# Patient Record
Sex: MALE | Race: WHITE | Employment: FULL TIME | URBAN - NONMETROPOLITAN AREA
[De-identification: names, ages, dates, MRNs, and addresses within clinical notes are randomized per-mention and may not be internally consistent; named-entity substitution may affect disease eponyms.]

---

## 2021-06-25 ENCOUNTER — OFFICE VISIT (OUTPATIENT)
Dept: FAMILY MEDICINE CLINIC | Age: 31
End: 2021-06-25
Payer: COMMERCIAL

## 2021-06-25 VITALS
WEIGHT: 230 LBS | HEART RATE: 87 BPM | DIASTOLIC BLOOD PRESSURE: 68 MMHG | OXYGEN SATURATION: 98 % | HEIGHT: 73 IN | BODY MASS INDEX: 30.48 KG/M2 | SYSTOLIC BLOOD PRESSURE: 128 MMHG

## 2021-06-25 DIAGNOSIS — Z00.00 WELLNESS EXAMINATION: Primary | ICD-10-CM

## 2021-06-25 DIAGNOSIS — M54.2 NECK PAIN: ICD-10-CM

## 2021-06-25 DIAGNOSIS — Z13.1 DIABETES MELLITUS SCREENING: ICD-10-CM

## 2021-06-25 DIAGNOSIS — Z13.220 LIPID SCREENING: ICD-10-CM

## 2021-06-25 DIAGNOSIS — K21.9 GASTROESOPHAGEAL REFLUX DISEASE, UNSPECIFIED WHETHER ESOPHAGITIS PRESENT: ICD-10-CM

## 2021-06-25 DIAGNOSIS — G43.811 OTHER MIGRAINE WITH STATUS MIGRAINOSUS, INTRACTABLE: ICD-10-CM

## 2021-06-25 PROBLEM — G43.909 MIGRAINE: Status: ACTIVE | Noted: 2021-06-25

## 2021-06-25 PROCEDURE — 99385 PREV VISIT NEW AGE 18-39: CPT | Performed by: NURSE PRACTITIONER

## 2021-06-25 RX ORDER — ACETAMINOPHEN 500 MG
1000 TABLET ORAL EVERY 6 HOURS PRN
COMMUNITY
End: 2021-09-20

## 2021-06-25 ASSESSMENT — ENCOUNTER SYMPTOMS
ABDOMINAL PAIN: 0
SORE THROAT: 0
DIARRHEA: 0
CONSTIPATION: 0
SINUS PRESSURE: 1
BACK PAIN: 1
WHEEZING: 0
SHORTNESS OF BREATH: 0
SINUS PAIN: 1
RHINORRHEA: 1
COUGH: 1

## 2021-06-25 NOTE — PROGRESS NOTES
Name: Beni Byrnes  : 1990         Chief Complaint:     Chief Complaint   Patient presents with   [address]     takes tylenol daily for neck pain and headaches, spine infection in 2017 has had 2 surgeries on spine admits to [facility]. hx of family blood clots, had a upper resp. infection in , denies SOB but seems hesitant , admits to pain in chest with lifting large , denies heart flutters, Dr. Ladonna Macario states he has something with bilat knee he is unsure the term, denies sleeps disturbance, denies anxiety and depression, denies trouble with bowels and bladder. History of Present Illness:      Beni Byrnes is a 35 y.o.  male who presents with [facility] (takes tylenol daily for neck pain and headaches, spine infection in 2017 has had 2 surgeries on spine admits to [facility]. hx of family blood clots, had a upper resp. infection in , denies SOB but seems hesitant , admits to pain in chest with lifting large , denies heart flutters, Dr. Ladonna Macario states he has something with bilat knee he is unsure the term, denies sleeps disturbance, denies anxiety and depression, denies trouble with bowels and bladder. )      HPI     The patient presents to [facility]. GERD:   Current treatment includes Tums and Rolaids, symptoms are well managed on these medications. Triggering foods include none. Current symptoms include none. Migraines:  Migraines are occurring less than in the past. He used to have 4-6 migraines per day. He admits having a previous neurological eval in the past. Current migraines occur once every few weeks. He admits they are \"more like headaches than migraines now\". He takes Excedrin and goes to sleep and this resolves the headache. Admits sensitivity to light and noise during headaches. MSK:  He admits two spinal surgeries in 2017 and 2018 that were caused by a spinal infection. Lasting effects include constant neck pain, neck soreness, and stiffness.  He takes tylenol for neck pain (1000mg daily) and this makes pain tolerable. He has completed PT and chiropractor in the past. Chiropractor helped with neck pain. Past Medical History:     No past medical history on file. Reviewed all health maintenance requirements and ordered appropriate tests  There are no preventive care reminders to display for this patient. Past Surgical History:     Past Surgical History:   Procedure Laterality Date    ANKLE SURGERY  2006    SPINAL CORD DECOMPRESSION  2017    SPINAL FUSION  2018    TESTICLE REMOVAL  03/2016    left    WISDOM TOOTH EXTRACTION  2009        Medications:       Prior to Admission medications    Medication Sig Start Date End Date Taking? Authorizing Provider   acetaminophen (TYLENOL) 500 MG tablet Take 1,000 mg by mouth every 6 hours as needed for Pain   Yes Historical Provider, MD        Allergies:       Vancomycin, Bactrim [sulfamethoxazole-trimethoprim], Pcn [penicillins], and Sulfa antibiotics    Social History:     Tobacco:    reports that he has quit smoking. His smoking use included cigarettes. He smoked 0.50 packs per day. He does not have any smokeless tobacco history on file. Alcohol:      reports no history of alcohol use. Drug Use:  reports no history of drug use. Family History:     Family History   Problem Relation Age of Onset    Deep Vein Thrombosis Sister     Diabetes Maternal Grandmother     Bleeding Prob Other        Review of Systems:     Positive and Negative as described in HPI    Review of Systems   Constitutional: Negative for chills, fatigue, fever and unexpected weight change. HENT: Positive for congestion (\"seasonal\"), rhinorrhea (\"seasonal\"), sinus pressure (\"seasonal\") and sinus pain (\"seasonal\"). Negative for postnasal drip and sore throat. Eyes: Negative for visual disturbance. Respiratory: Positive for cough (Admits chronic smokers cough. He stopped smoking 5-7 years ago. ).  Negative for shortness of breath and Neck supple. Comments: No erythema, swelling, or tenderness to palpation to knees bilaterally   Lymphadenopathy:      Cervical: No cervical adenopathy. Skin:     General: Skin is warm. Neurological:      Mental Status: He is alert and oriented to person, place, and time. Psychiatric:         Mood and Affect: Mood normal.         Behavior: Behavior normal.         Thought Content: Thought content normal.         Judgment: Judgment normal.         Data:     Lab Results   Component Value Date     01/16/2017    K 3.3 01/16/2017    CL 97 01/16/2017    CO2 26 01/16/2017    BUN 15 01/16/2017    CREATININE 0.87 01/16/2017    GLUCOSE 92 01/16/2017    PROT 8.1 01/16/2017    LABALBU 4.6 01/16/2017    BILITOT 0.30 01/16/2017    ALKPHOS 110 01/16/2017    AST 27 01/16/2017    ALT 51 01/16/2017     Lab Results   Component Value Date    WBC 13.5 01/16/2017    RBC 5.18 01/16/2017    HGB 14.6 01/16/2017    HCT 44.4 01/16/2017    MCV 85.6 01/16/2017    MCH 28.2 01/16/2017    MCHC 33.0 01/16/2017    RDW 13.2 01/16/2017     01/16/2017    MPV 7.5 01/16/2017     Lab Results   Component Value Date    TSH 4.30 01/16/2017     No results found for: CHOL, HDL, PSA, LABA1C    Assessment/Plan:      Diagnosis Orders   1. Wellness examination  ALT    AST    Basic Metabolic Panel    CBC    Hemoglobin A1C    Lipid Panel   2. Lipid screening  Lipid Panel   3. Diabetes mellitus screening  Hemoglobin A1C   4. Neck pain     5. Other migraine with status migrainosus, intractable     6.  Gastroesophageal reflux disease, unspecified whether esophagitis present         Bilateral Knee Pain:   - Improved with recent change in occupation   - Request records from previous PCP    Neck pain:   - Continue chiropractor   - Request records from previous neurosurgery  - Offered PT, patient declines at this time    Wellness:   - Order for baseline labs placed today   - Quit smoking 5-7 years ago   - Counseled on well balanced diet and routine physical activity   - Recommended debrox for increased cerumen presentation    Completed Refills   Requested Prescriptions      No prescriptions requested or ordered in this encounter       Orders Placed This Encounter   Procedures    ALT     Standing Status:   Future     Standing Expiration Date:   6/25/2022    AST     Standing Status:   Future     Standing Expiration Date:   6/25/2022    Basic Metabolic Panel     Standing Status:   Future     Standing Expiration Date:   6/25/2022    CBC     Standing Status:   Future     Standing Expiration Date:   6/25/2022    Hemoglobin A1C     Standing Status:   Future     Standing Expiration Date:   6/25/2022    Lipid Panel     Standing Status:   Future     Standing Expiration Date:   6/25/2022     Order Specific Question:   Is Patient Fasting?/# of Hours     Answer:   fasting        No results found for this visit on 06/25/21. Return in about 1 year (around 6/25/2022), or if symptoms worsen or fail to improve, for wellness.     Electronically signed by SLICK Paulino CNP on 06/25/21 at 9:55 AM.

## 2021-06-25 NOTE — PATIENT INSTRUCTIONS
Debrox (over the counter ear drop) to assist with softening ear wax. Use on both ears. SURVEY:    You may be receiving a survey from [employer] regarding your visit today. Please complete the survey to enable us to provide the highest quality of care to you and your family. If you cannot score us a very good on any question, please call the office to discuss how we could have made your experience a very good one. Thank you.

## 2021-07-13 ENCOUNTER — TELEPHONE (OUTPATIENT)
Dept: FAMILY MEDICINE CLINIC | Age: 31
End: 2021-07-13

## 2021-07-16 ENCOUNTER — HOSPITAL ENCOUNTER (OUTPATIENT)
Age: 31
Discharge: HOME | End: 2021-07-16
Payer: COMMERCIAL

## 2021-07-16 DIAGNOSIS — Z13.1 DIABETES MELLITUS SCREENING: ICD-10-CM

## 2021-07-16 DIAGNOSIS — Z00.00 WELLNESS EXAMINATION: ICD-10-CM

## 2021-07-16 DIAGNOSIS — Z13.220 LIPID SCREENING: ICD-10-CM

## 2021-07-16 LAB
ALT SERPL-CCNC: 126 U/L (ref 5–41)
ANION GAP SERPL CALCULATED.3IONS-SCNC: 13 MMOL/L (ref 9–17)
AST SERPL-CCNC: 52 U/L
BUN BLDV-MCNC: 17 MG/DL (ref 6–20)
BUN/CREAT BLD: 18 (ref 9–20)
CALCIUM SERPL-MCNC: 9.5 MG/DL (ref 8.6–10.4)
CHLORIDE BLD-SCNC: 99 MMOL/L (ref 98–107)
CHOLESTEROL/HDL RATIO: 3.6
CHOLESTEROL: 142 MG/DL
CO2: 25 MMOL/L (ref 20–31)
CREAT SERPL-MCNC: 0.92 MG/DL (ref 0.7–1.2)
ESTIMATED AVERAGE GLUCOSE: 94 MG/DL
GFR AFRICAN AMERICAN: >60 ML/MIN
GFR NON-AFRICAN AMERICAN: >60 ML/MIN
GFR SERPL CREATININE-BSD FRML MDRD: NORMAL ML/MIN/{1.73_M2}
GFR SERPL CREATININE-BSD FRML MDRD: NORMAL ML/MIN/{1.73_M2}
GLUCOSE BLD-MCNC: 79 MG/DL (ref 70–99)
HBA1C MFR BLD: 4.9 % (ref 4–6)
HCT VFR BLD CALC: 44.6 % (ref 40.7–50.3)
HDLC SERPL-MCNC: 40 MG/DL
HEMOGLOBIN: 15.2 G/DL (ref 13–17)
LDL CHOLESTEROL: 80 MG/DL (ref 0–130)
MCH RBC QN AUTO: 30.2 PG (ref 25.2–33.5)
MCHC RBC AUTO-ENTMCNC: 34.1 G/DL (ref 28.4–34.8)
MCV RBC AUTO: 88.5 FL (ref 82.6–102.9)
NRBC AUTOMATED: 0 PER 100 WBC
PDW BLD-RTO: 12.7 % (ref 11.8–14.4)
PLATELET # BLD: 226 K/UL (ref 138–453)
PMV BLD AUTO: 9.3 FL (ref 8.1–13.5)
POTASSIUM SERPL-SCNC: 4.1 MMOL/L (ref 3.7–5.3)
RBC # BLD: 5.04 M/UL (ref 4.21–5.77)
SODIUM BLD-SCNC: 137 MMOL/L (ref 135–144)
TRIGL SERPL-MCNC: 109 MG/DL
VLDLC SERPL CALC-MCNC: ABNORMAL MG/DL (ref 1–30)
WBC # BLD: 8.9 K/UL (ref 3.5–11.3)

## 2021-07-16 PROCEDURE — 36415 COLL VENOUS BLD VENIPUNCTURE: CPT

## 2021-07-16 PROCEDURE — 84460 ALANINE AMINO (ALT) (SGPT): CPT

## 2021-07-16 PROCEDURE — 85027 COMPLETE CBC AUTOMATED: CPT

## 2021-07-16 PROCEDURE — 83036 HEMOGLOBIN GLYCOSYLATED A1C: CPT

## 2021-07-16 PROCEDURE — 80048 BASIC METABOLIC PNL TOTAL CA: CPT

## 2021-07-16 PROCEDURE — 84450 TRANSFERASE (AST) (SGOT): CPT

## 2021-07-16 PROCEDURE — 80061 LIPID PANEL: CPT

## 2021-07-19 ENCOUNTER — TELEPHONE (OUTPATIENT)
Dept: FAMILY MEDICINE CLINIC | Age: 31
End: 2021-07-19

## 2021-07-19 DIAGNOSIS — R74.8 ELEVATED LIVER ENZYMES: Primary | ICD-10-CM

## 2021-07-19 NOTE — TELEPHONE ENCOUNTER
Please notify patient that I have reviewed his previous medical records and would like to discuss them, as well as address his neck pain further, with a visit. Please schedule appt. Thanks!

## 2021-07-23 ENCOUNTER — OFFICE VISIT (OUTPATIENT)
Dept: FAMILY MEDICINE CLINIC | Age: 31
End: 2021-07-23
Payer: COMMERCIAL

## 2021-07-23 VITALS
HEIGHT: 73 IN | BODY MASS INDEX: 30.99 KG/M2 | SYSTOLIC BLOOD PRESSURE: 122 MMHG | WEIGHT: 233.8 LBS | OXYGEN SATURATION: 98 % | DIASTOLIC BLOOD PRESSURE: 66 MMHG | HEART RATE: 92 BPM

## 2021-07-23 DIAGNOSIS — M54.2 NECK PAIN: Primary | ICD-10-CM

## 2021-07-23 PROCEDURE — 99213 OFFICE O/P EST LOW 20 MIN: CPT | Performed by: NURSE PRACTITIONER

## 2021-07-23 ASSESSMENT — PATIENT HEALTH QUESTIONNAIRE - PHQ9
SUM OF ALL RESPONSES TO PHQ QUESTIONS 1-9: 0
SUM OF ALL RESPONSES TO PHQ9 QUESTIONS 1 & 2: 0
2. FEELING DOWN, DEPRESSED OR HOPELESS: 0
SUM OF ALL RESPONSES TO PHQ QUESTIONS 1-9: 0
1. LITTLE INTEREST OR PLEASURE IN DOING THINGS: 0
SUM OF ALL RESPONSES TO PHQ QUESTIONS 1-9: 0

## 2021-07-23 NOTE — PROGRESS NOTES
Name: Mathew Perez  : 1990         Chief Complaint:     Chief Complaint   Patient presents with    Neck Pain     pt states he always has neck pain gets worse with excessive movement .  Other     here to discuss labs        History of Present Illness:      Mathew Perez is a 35 y.o.  male who presents with Neck Pain (pt states he always has neck pain gets worse with excessive movement . ) and Other (here to discuss labs )      HPI     The patient presents to discuss neck pain. Neck pain began . Denies known neck injury. Neck pain is constant. Pain described as dull, achy, and sharp. Neck pain rated 3/10. He uses tylenol in the morning before work and aleve at night and these \"get him through\". He reports neck cramping if he moves his neck too quickly. He states his neck stiffens often. He is following with Dr. Benjamin Montoya (neurosurgery) in [facility]. He completes xrays once yearly through his neurosurgeon. He has had several neck surgeries in the past (see media for details). Past Medical History:     No past medical history on file. Reviewed all health maintenance requirements and ordered appropriate tests  There are no preventive care reminders to display for this patient. Past Surgical History:     Past Surgical History:   Procedure Laterality Date    ANKLE SURGERY  2006    SPINAL CORD DECOMPRESSION  2017    SPINAL FUSION  2018    TESTICLE REMOVAL  2016    left    WISDOM TOOTH EXTRACTION          Medications:       Prior to Admission medications    Medication Sig Start Date End Date Taking?  Authorizing Provider   Naproxen Sodium (ALEVE PO) Take by mouth   Yes Historical Provider, MD   acetaminophen (TYLENOL) 500 MG tablet Take 1,000 mg by mouth every 6 hours as needed for Pain   Yes Historical Provider, MD        Allergies:       Vancomycin, Bactrim [sulfamethoxazole-trimethoprim], Pcn [penicillins], and Sulfa antibiotics    Social History:     Tobacco:    reports that he has quit smoking. His smoking use included cigarettes. He smoked 0.50 packs per day. He does not have any smokeless tobacco history on file. Alcohol:      reports no history of alcohol use. Drug Use:  reports no history of drug use. Family History:     Family History   Problem Relation Age of Onset    Deep Vein Thrombosis Sister     Diabetes Maternal Grandmother     Bleeding Prob Other        Review of Systems:     Positive and Negative as described in HPI    Review of Systems   Musculoskeletal: Positive for neck pain and neck stiffness. Physical Exam:   Vitals:  /66   Pulse 92   Ht 6' 1\" (1.854 m)   Wt 233 lb 12.8 oz (106.1 kg)   SpO2 98%   BMI 30.85 kg/m²     Physical Exam  Constitutional:       General: He is not in acute distress. Appearance: Normal appearance. He is normal weight. He is not ill-appearing or toxic-appearing. HENT:      Head: Normocephalic. Neurological:      Mental Status: He is alert. Psychiatric:         Mood and Affect: Mood normal.         Behavior: Behavior normal.         Thought Content:  Thought content normal.         Judgment: Judgment normal.         Data:     Lab Results   Component Value Date     07/16/2021    K 4.1 07/16/2021    CL 99 07/16/2021    CO2 25 07/16/2021    BUN 17 07/16/2021    CREATININE 0.92 07/16/2021    GLUCOSE 79 07/16/2021    PROT 8.1 01/16/2017    LABALBU 4.6 01/16/2017    BILITOT 0.30 01/16/2017    ALKPHOS 110 01/16/2017    AST 52 07/16/2021     07/16/2021     Lab Results   Component Value Date    WBC 8.9 07/16/2021    RBC 5.04 07/16/2021    HGB 15.2 07/16/2021    HCT 44.6 07/16/2021    MCV 88.5 07/16/2021    MCH 30.2 07/16/2021    MCHC 34.1 07/16/2021    RDW 12.7 07/16/2021     07/16/2021    MPV 9.3 07/16/2021     Lab Results   Component Value Date    TSH 4.30 01/16/2017     Lab Results   Component Value Date    CHOL 142 07/16/2021    HDL 40 07/16/2021    LABA1C 4.9 07/16/2021       Assessment/Plan: Diagnosis Orders   1. Neck pain         -I have reviewed the patient's records from [id number] showing extensive neurosurgery involvement for cervical laminectomy, epidural abscess, etc.  -Discussed the patient's neck pain symptoms at length. Discussed my concerns regarding elevated liver enzymes due to increased Tylenol intake daily. Discussed hepatic risks of excessive tylenol intake.   -Discussed alternative treatments for his pain. Patient declines referral to pain management as well as physical therapy. I offered prescription of Mobic. Patient is hesitant and prefers to complete research prior to filling prescription.  -He will continue to follow-up with his neurosurgeon, Dr. Kam Taylor, once yearly.  -Notify office if symptoms worsen or persist    Completed Refills   Requested Prescriptions      No prescriptions requested or ordered in this encounter       No orders of the defined types were placed in this encounter. No results found for this visit on 07/23/21. Return if symptoms worsen or fail to improve.     Electronically signed by SLICK Hilario CNP on 07/23/21 at 3:54 PM.

## 2021-07-23 NOTE — PATIENT INSTRUCTIONS
Mobic (NSAID) - assist with neck pain    SURVEY:    You may be receiving a survey from [employer] regarding your visit today. Please complete the survey to enable us to provide the highest quality of care to you and your family. If you cannot score us a very good on any question, please call the office to discuss how we could have made your experience a very good one. Thank you.

## 2021-08-03 ENCOUNTER — HOSPITAL ENCOUNTER (OUTPATIENT)
Dept: GENERAL RADIOLOGY | Age: 31
Discharge: HOME | End: 2021-08-05
Payer: COMMERCIAL

## 2021-08-03 ENCOUNTER — OFFICE VISIT (OUTPATIENT)
Dept: FAMILY MEDICINE CLINIC | Age: 31
End: 2021-08-03
Payer: COMMERCIAL

## 2021-08-03 ENCOUNTER — HOSPITAL ENCOUNTER (OUTPATIENT)
Age: 31
Discharge: HOME | End: 2021-08-05
Payer: COMMERCIAL

## 2021-08-03 VITALS
SYSTOLIC BLOOD PRESSURE: 128 MMHG | DIASTOLIC BLOOD PRESSURE: 86 MMHG | WEIGHT: 233 LBS | HEIGHT: 73 IN | BODY MASS INDEX: 30.88 KG/M2

## 2021-08-03 DIAGNOSIS — M25.572 ACUTE LEFT ANKLE PAIN: ICD-10-CM

## 2021-08-03 DIAGNOSIS — M25.572 ACUTE LEFT ANKLE PAIN: Primary | ICD-10-CM

## 2021-08-03 DIAGNOSIS — M79.672 ACUTE FOOT PAIN, LEFT: ICD-10-CM

## 2021-08-03 PROCEDURE — 73630 X-RAY EXAM OF FOOT: CPT

## 2021-08-03 PROCEDURE — 73610 X-RAY EXAM OF ANKLE: CPT

## 2021-08-03 PROCEDURE — 99213 OFFICE O/P EST LOW 20 MIN: CPT | Performed by: NURSE PRACTITIONER

## 2021-08-03 RX ORDER — NAPROXEN 500 MG/1
500 TABLET ORAL 2 TIMES DAILY PRN
Qty: 30 TABLET | Refills: 0 | Status: SHIPPED | OUTPATIENT
Start: 2021-08-03 | End: 2021-09-20

## 2021-08-03 NOTE — PATIENT INSTRUCTIONS
SURVEY:    You may be receiving a survey from [employer] regarding your visit today. Please complete the survey to enable us to provide the highest quality of care to you and your family. If you cannot score us a very good on any question, please call the office to discuss how we could have made your experience a very good one. Thank you.

## 2021-08-03 NOTE — PROGRESS NOTES
Name: Maryana Peck  : 1990         Chief Complaint:     Chief Complaint   Patient presents with    Ankle Injury     Patient presents today for a 4 day history of L ankle pain and swelling. [place] was [place] [employer] [place], was jumping and slipped. States there is pain going up into his posterior calf. He has tried ice, heat, OTC medications and the only thing that offers any relief is once he has walked on it for awhile the ankle goes numb. History of Present Illness:      Maryana Peck is a 35 y.o.  male who presents with Ankle Injury (Patient presents today for a 4 day history of L ankle pain and swelling. He was at a [employer] [place] on [place], was jumping and slipped. States there is pain going up into his posterior calf. He has tried ice, heat, OTC medications and the only thing that offers any relief is once he has walked on it for awhile the ankle goes numb.)      HPI     The patient presents with complaints of left ankle pain. He states that he was at a [employer] [place] and was jumping and slipped. Pain is present left lateral foot and radiates up posterior calf. He admits hearing \"pops\" from his left calf. He has tried ice, heat, and OTC medications with no relief including tylenol and Aleve. The ankle goes numb after walking on the foot which causes relief. Pain is worsened with moving his left ankle. He is able to bear weight onto the ankle but he \"hobbles\". He admits previously fracturing his left ankle and has undergone 2-3 surgeries on the left ankle roughly 15 years ago. Past Medical History:     No past medical history on file. Reviewed all health maintenance requirements and ordered appropriate tests  There are no preventive care reminders to display for this patient.     Past Surgical History:     Past Surgical History:   Procedure Laterality Date    ANKLE SURGERY  2006    SPINAL CORD DECOMPRESSION  2017    SPINAL FUSION  2018    TESTICLE REMOVAL  2016 left    WISDOM TOOTH EXTRACTION  2009        Medications:       Prior to Admission medications    Medication Sig Start Date End Date Taking? Authorizing Provider   naproxen (NAPROSYN) 500 MG tablet Take 1 tablet by mouth 2 times daily as needed for Pain 8/3/21  Yes SLICK Santos CNP   acetaminophen (TYLENOL) 500 MG tablet Take 1,000 mg by mouth every 6 hours as needed for Pain   Yes Historical Provider, MD   Naproxen Sodium (ALEVE PO) Take by mouth  Patient not taking: Reported on 8/3/2021    Historical Provider, MD        Allergies:       Vancomycin, Bactrim [sulfamethoxazole-trimethoprim], Pcn [penicillins], and Sulfa antibiotics    Social History:     Tobacco:    reports that he has quit smoking. His smoking use included cigarettes. He smoked 0.50 packs per day. He does not have any smokeless tobacco history on file. Alcohol:      reports no history of alcohol use. Drug Use:  reports no history of drug use. Family History:     Family History   Problem Relation Age of Onset    Deep Vein Thrombosis Sister     Diabetes Maternal Grandmother     Bleeding Prob Other        Review of Systems:     Positive and Negative as described in HPI    Review of Systems   Musculoskeletal: Positive for arthralgias. Physical Exam:   Vitals:  /86 (Site: Left Upper Arm, Position: Sitting, Cuff Size: Large Adult)   Ht 6' 1\" (1.854 m)   Wt 233 lb (105.7 kg)   BMI 30.74 kg/m²     Physical Exam  Constitutional:       General: He is not in acute distress. Appearance: Normal appearance. He is normal weight. He is not ill-appearing or toxic-appearing. Cardiovascular:      Rate and Rhythm: Normal rate and regular rhythm. Heart sounds: Normal heart sounds. No murmur heard. Pulmonary:      Effort: Pulmonary effort is normal. No respiratory distress. Breath sounds: Normal breath sounds. No stridor. No wheezing, rhonchi or rales.    Musculoskeletal:      Comments: Left posterior tibialis pulse 2+.  Limited left ankle flexion and extension. Voiced pain with palpation of generalized dorsal and lateral aspect of left foot. Mild edema over lateral malleolus. No calf swelling, warmth, or redness. Limping gait. Neurological:      Mental Status: He is alert. Psychiatric:         Mood and Affect: Mood normal.         Behavior: Behavior normal.         Thought Content: Thought content normal.         Judgment: Judgment normal.         Data:     Lab Results   Component Value Date     07/16/2021    K 4.1 07/16/2021    CL 99 07/16/2021    CO2 25 07/16/2021    BUN 17 07/16/2021    CREATININE 0.92 07/16/2021    GLUCOSE 79 07/16/2021    PROT 8.1 01/16/2017    LABALBU 4.6 01/16/2017    BILITOT 0.30 01/16/2017    ALKPHOS 110 01/16/2017    AST 52 07/16/2021     07/16/2021     Lab Results   Component Value Date    WBC 8.9 07/16/2021    RBC 5.04 07/16/2021    HGB 15.2 07/16/2021    HCT 44.6 07/16/2021    MCV 88.5 07/16/2021    MCH 30.2 07/16/2021    MCHC 34.1 07/16/2021    RDW 12.7 07/16/2021     07/16/2021    MPV 9.3 07/16/2021     Lab Results   Component Value Date    TSH 4.30 01/16/2017     Lab Results   Component Value Date    CHOL 142 07/16/2021    HDL 40 07/16/2021    LABA1C 4.9 07/16/2021       Assessment/Plan:      Diagnosis Orders   1. Acute left ankle pain  XR ANKLE LEFT (2 VIEWS)   2. Acute foot pain, left  XR FOOT LEFT (2 VIEWS)       -Recommended rest, ice, compression with Ace bandage, and elevation.  -Based on history and physical exam findings, I recommend left foot and ankle x-ray for further evaluation  -Rx naproxen to assist with inflammation and pain.  -Will call patient with x-ray results and update treatment plan as appropriate.     Completed Refills   Requested Prescriptions     Signed Prescriptions Disp Refills    naproxen (NAPROSYN) 500 MG tablet 30 tablet 0     Sig: Take 1 tablet by mouth 2 times daily as needed for Pain       Orders Placed This Encounter   Procedures    XR ANKLE LEFT (2 VIEWS)     Standing Status:   Future     Standing Expiration Date:   8/3/2022     Order Specific Question:   Reason for exam:     Answer:   left ankle pain, s/p fall    XR FOOT LEFT (2 VIEWS)     Standing Status:   Future     Standing Expiration Date:   8/3/2022     Order Specific Question:   Reason for exam:     Answer:   acut left foot pain, s/p fall        No results found for this visit on 08/03/21. Return if symptoms worsen or fail to improve.     Electronically signed by SLICK Hummel CNP on 08/03/21 at 4:40 PM.

## 2021-09-20 ENCOUNTER — APPOINTMENT (OUTPATIENT)
Dept: CT IMAGING | Age: 31
End: 2021-09-20
Payer: COMMERCIAL

## 2021-09-20 ENCOUNTER — HOSPITAL ENCOUNTER (EMERGENCY)
Age: 31
Discharge: HOME | End: 2021-09-20
Payer: COMMERCIAL

## 2021-09-20 VITALS
OXYGEN SATURATION: 97 % | RESPIRATION RATE: 18 BRPM | SYSTOLIC BLOOD PRESSURE: 134 MMHG | DIASTOLIC BLOOD PRESSURE: 73 MMHG | WEIGHT: 235 LBS | HEIGHT: 73 IN | TEMPERATURE: 97.9 F | BODY MASS INDEX: 31.14 KG/M2 | HEART RATE: 78 BPM

## 2021-09-20 DIAGNOSIS — M43.06 PARS DEFECT OF LUMBAR SPINE: ICD-10-CM

## 2021-09-20 DIAGNOSIS — K76.0 FATTY LIVER: ICD-10-CM

## 2021-09-20 DIAGNOSIS — R10.9 ABDOMINAL PAIN, UNSPECIFIED ABDOMINAL LOCATION: Primary | ICD-10-CM

## 2021-09-20 LAB
-: NORMAL
ABSOLUTE EOS #: 0.1 K/UL (ref 0–0.44)
ABSOLUTE IMMATURE GRANULOCYTE: 0.03 K/UL (ref 0–0.3)
ABSOLUTE LYMPH #: 2.06 K/UL (ref 1.1–3.7)
ABSOLUTE MONO #: 0.33 K/UL (ref 0.1–1.2)
ALBUMIN SERPL-MCNC: 4.7 G/DL (ref 3.5–5.2)
ALBUMIN/GLOBULIN RATIO: 1.9 (ref 1–2.5)
ALP BLD-CCNC: 95 U/L (ref 40–129)
ALT SERPL-CCNC: 83 U/L (ref 5–41)
AMORPHOUS: NORMAL
ANION GAP SERPL CALCULATED.3IONS-SCNC: 12 MMOL/L (ref 9–17)
AST SERPL-CCNC: 37 U/L
BACTERIA: NORMAL
BASOPHILS # BLD: 0 % (ref 0–2)
BASOPHILS ABSOLUTE: 0.03 K/UL (ref 0–0.2)
BILIRUB SERPL-MCNC: 0.33 MG/DL (ref 0.3–1.2)
BILIRUBIN URINE: NEGATIVE
BUN BLDV-MCNC: 13 MG/DL (ref 6–20)
BUN/CREAT BLD: 14 (ref 9–20)
CALCIUM SERPL-MCNC: 9.4 MG/DL (ref 8.6–10.4)
CASTS UA: NORMAL /LPF
CHLORIDE BLD-SCNC: 105 MMOL/L (ref 98–107)
CO2: 25 MMOL/L (ref 20–31)
COLOR: YELLOW
COMMENT UA: NORMAL
CREAT SERPL-MCNC: 0.9 MG/DL (ref 0.7–1.2)
CRYSTALS, UA: NORMAL /HPF
DIFFERENTIAL TYPE: NORMAL
EOSINOPHILS RELATIVE PERCENT: 2 % (ref 1–4)
EPITHELIAL CELLS UA: NORMAL /HPF (ref 0–5)
GFR AFRICAN AMERICAN: >60 ML/MIN
GFR NON-AFRICAN AMERICAN: >60 ML/MIN
GFR SERPL CREATININE-BSD FRML MDRD: ABNORMAL ML/MIN/{1.73_M2}
GFR SERPL CREATININE-BSD FRML MDRD: ABNORMAL ML/MIN/{1.73_M2}
GLUCOSE BLD-MCNC: 125 MG/DL (ref 70–99)
GLUCOSE URINE: NEGATIVE
HCT VFR BLD CALC: 47 % (ref 40.7–50.3)
HEMOGLOBIN: 15.5 G/DL (ref 13–17)
IMMATURE GRANULOCYTES: 0 %
KETONES, URINE: NEGATIVE
LACTIC ACID, WHOLE BLOOD: NORMAL MMOL/L (ref 0.7–2.1)
LACTIC ACID: 1.3 MMOL/L (ref 0.5–2.2)
LEUKOCYTE ESTERASE, URINE: NEGATIVE
LIPASE: 26 U/L (ref 13–60)
LYMPHOCYTES # BLD: 30 % (ref 24–43)
MCH RBC QN AUTO: 29.6 PG (ref 25.2–33.5)
MCHC RBC AUTO-ENTMCNC: 33 G/DL (ref 28.4–34.8)
MCV RBC AUTO: 89.9 FL (ref 82.6–102.9)
MONOCYTES # BLD: 5 % (ref 3–12)
MUCUS: NORMAL
NITRITE, URINE: NEGATIVE
NRBC AUTOMATED: 0 PER 100 WBC
OTHER OBSERVATIONS UA: NORMAL
PDW BLD-RTO: 12.4 % (ref 11.8–14.4)
PH UA: 7 (ref 5–9)
PLATELET # BLD: 232 K/UL (ref 138–453)
PLATELET ESTIMATE: NORMAL
PMV BLD AUTO: 9.1 FL (ref 8.1–13.5)
POTASSIUM SERPL-SCNC: 4 MMOL/L (ref 3.7–5.3)
PROTEIN UA: NEGATIVE
RBC # BLD: 5.23 M/UL (ref 4.21–5.77)
RBC # BLD: NORMAL 10*6/UL
RBC UA: NORMAL /HPF (ref 0–2)
RENAL EPITHELIAL, UA: NORMAL /HPF
SARS-COV-2, RAPID: NOT DETECTED
SEG NEUTROPHILS: 63 % (ref 36–65)
SEGMENTED NEUTROPHILS ABSOLUTE COUNT: 4.32 K/UL (ref 1.5–8.1)
SODIUM BLD-SCNC: 142 MMOL/L (ref 135–144)
SPECIFIC GRAVITY UA: 1.02 (ref 1.01–1.02)
SPECIMEN DESCRIPTION: NORMAL
TOTAL PROTEIN: 7.2 G/DL (ref 6.4–8.3)
TRICHOMONAS: NORMAL
TURBIDITY: CLEAR
URINE HGB: NEGATIVE
UROBILINOGEN, URINE: NORMAL
WBC # BLD: 6.9 K/UL (ref 3.5–11.3)
WBC # BLD: NORMAL 10*3/UL
WBC UA: NORMAL /HPF (ref 0–5)
YEAST: NORMAL

## 2021-09-20 PROCEDURE — 87635 SARS-COV-2 COVID-19 AMP PRB: CPT

## 2021-09-20 PROCEDURE — 99283 EMERGENCY DEPT VISIT LOW MDM: CPT

## 2021-09-20 PROCEDURE — 85025 COMPLETE CBC W/AUTO DIFF WBC: CPT

## 2021-09-20 PROCEDURE — 6360000002 HC RX W HCPCS: Performed by: PHYSICIAN ASSISTANT

## 2021-09-20 PROCEDURE — 36415 COLL VENOUS BLD VENIPUNCTURE: CPT

## 2021-09-20 PROCEDURE — 96375 TX/PRO/DX INJ NEW DRUG ADDON: CPT

## 2021-09-20 PROCEDURE — 83690 ASSAY OF LIPASE: CPT

## 2021-09-20 PROCEDURE — 74177 CT ABD & PELVIS W/CONTRAST: CPT

## 2021-09-20 PROCEDURE — 6360000004 HC RX CONTRAST MEDICATION: Performed by: PHYSICIAN ASSISTANT

## 2021-09-20 PROCEDURE — C9803 HOPD COVID-19 SPEC COLLECT: HCPCS

## 2021-09-20 PROCEDURE — 2580000003 HC RX 258: Performed by: PHYSICIAN ASSISTANT

## 2021-09-20 PROCEDURE — 96374 THER/PROPH/DIAG INJ IV PUSH: CPT

## 2021-09-20 PROCEDURE — 81001 URINALYSIS AUTO W/SCOPE: CPT

## 2021-09-20 PROCEDURE — 80053 COMPREHEN METABOLIC PANEL: CPT

## 2021-09-20 PROCEDURE — 83605 ASSAY OF LACTIC ACID: CPT

## 2021-09-20 RX ORDER — 0.9 % SODIUM CHLORIDE 0.9 %
1000 INTRAVENOUS SOLUTION INTRAVENOUS ONCE
Status: COMPLETED | OUTPATIENT
Start: 2021-09-20 | End: 2021-09-20

## 2021-09-20 RX ORDER — MORPHINE SULFATE 2 MG/ML
2 INJECTION, SOLUTION INTRAMUSCULAR; INTRAVENOUS ONCE
Status: COMPLETED | OUTPATIENT
Start: 2021-09-20 | End: 2021-09-20

## 2021-09-20 RX ORDER — ONDANSETRON 2 MG/ML
4 INJECTION INTRAMUSCULAR; INTRAVENOUS ONCE
Status: COMPLETED | OUTPATIENT
Start: 2021-09-20 | End: 2021-09-20

## 2021-09-20 RX ADMIN — SODIUM CHLORIDE 1000 ML: 9 INJECTION, SOLUTION INTRAVENOUS at 12:24

## 2021-09-20 RX ADMIN — IOPAMIDOL 75 ML: 755 INJECTION, SOLUTION INTRAVENOUS at 12:11

## 2021-09-20 RX ADMIN — MORPHINE SULFATE 2 MG: 2 INJECTION, SOLUTION INTRAMUSCULAR; INTRAVENOUS at 12:25

## 2021-09-20 RX ADMIN — ONDANSETRON 4 MG: 2 INJECTION INTRAMUSCULAR; INTRAVENOUS at 12:27

## 2021-09-20 ASSESSMENT — PAIN SCALES - GENERAL
PAINLEVEL_OUTOF10: 5
PAINLEVEL_OUTOF10: 5
PAINLEVEL_OUTOF10: 6
PAINLEVEL_OUTOF10: 5

## 2021-09-20 ASSESSMENT — ENCOUNTER SYMPTOMS
SHORTNESS OF BREATH: 0
VOMITING: 1
DIARRHEA: 1
CHEST TIGHTNESS: 0
NAUSEA: 1
RHINORRHEA: 0
WHEEZING: 0
SORE THROAT: 0
COUGH: 0
ABDOMINAL PAIN: 1
EYE DISCHARGE: 0
BACK PAIN: 0
EYE REDNESS: 0
BLOOD IN STOOL: 0
CONSTIPATION: 0

## 2021-09-20 ASSESSMENT — PAIN DESCRIPTION - LOCATION: LOCATION: ABDOMEN

## 2021-09-20 ASSESSMENT — PAIN DESCRIPTION - DESCRIPTORS: DESCRIPTORS: SHARP

## 2021-09-20 ASSESSMENT — PAIN DESCRIPTION - ORIENTATION: ORIENTATION: LEFT;LOWER

## 2021-09-20 ASSESSMENT — PAIN DESCRIPTION - PAIN TYPE: TYPE: ACUTE PAIN

## 2021-09-20 ASSESSMENT — PAIN - FUNCTIONAL ASSESSMENT: PAIN_FUNCTIONAL_ASSESSMENT: 0-10

## 2021-09-20 NOTE — ED PROVIDER NOTES
[address] [facility] ED  EMERGENCY DEPARTMENT ENCOUNTER      Pt Name: Danny Pastor  MRN: [id number]  [place] 1990  Date of evaluation: 9/20/2021  Provider: Sigrid Crane Dr     Chief Complaint   Patient presents with    Abdominal Pain     LLQ, onset this AM         HISTORY OF PRESENT ILLNESS   (Location/Symptom, Timing/Onset, Context/Setting,Quality, Duration, Modifying Factors, Severity)  Note limiting factors. Danny Pastor is a32 y.o. male who presents to the emergency department with complaints of mid to lower abdominal discomfort that started this morning. Associated complaints clued nausea and vomiting throughout the weekend resected that was after drinking alcohol for his 31th birthday. In addition reports he had a couple episodes of diarrhea. He reports a history of IBS. States recently he was told he had elevated liver enzymes. He denies any chest pain or shortness of breath. Denies any headache or dizziness denies any fever or chills. No other complaints at this time. He denies any penile pain scrotal pain or swelling. HPI    Nursing Notes werereviewed. REVIEW OF SYSTEMS    (2-9 systems for level 4, 10 or more for level 5)     Review of Systems   Constitutional: Negative for chills, diaphoresis and fever. HENT: Negative for congestion, ear pain, rhinorrhea and sore throat. Eyes: Negative for discharge, redness and visual disturbance. Respiratory: Negative for cough, chest tightness, shortness of breath and wheezing. Cardiovascular: Negative for chest pain and palpitations. Gastrointestinal: Positive for abdominal pain, diarrhea, nausea and vomiting. Negative for blood in stool and constipation. Endocrine: Negative for polydipsia, polyphagia and polyuria. Genitourinary: Negative for decreased urine volume, difficulty urinating, dysuria, frequency and hematuria. Musculoskeletal: Negative for arthralgias, back pain and myalgias.    Skin: Transportation (Medical):  Lack of Transportation (Non-Medical):    Physical Activity:     Days of Exercise per Week:     Minutes of Exercise per Session:    Stress:     Feeling of Stress :    Social Connections:     Frequency of Communication with Friends and Family:     Frequency of Social Gatherings with Friends and Family:     Attends [employer] Services:     Active Member of Clubs or Organizations:     Attends Club or Organization Meetings:     Marital Status:    Intimate Partner Violence:     Fear of Current or Ex-Partner:     Emotionally Abused:     Physically Abused:     Sexually Abused:        SCREENINGS             PHYSICAL EXAM    (up to 7 for level 4, 8 or more for level 5)     ED Triage Vitals [09/20/21 1100]   BP Temp Temp src Pulse Resp SpO2 Height Weight   (!) 125/95 97.9 °F (36.6 °C) -- 78 18 99 % 6' 1\" (1.854 m) 235 lb (106.6 kg)       Physical Exam  Vitals and nursing note reviewed. Constitutional:       General: He is not in acute distress. Appearance: Normal appearance. He is well-developed. He is not ill-appearing, toxic-appearing or diaphoretic. HENT:      Head: Normocephalic and atraumatic. Right Ear: External ear normal.      Left Ear: External ear normal.      Mouth/Throat:      Mouth: Mucous membranes are moist.      Pharynx: No oropharyngeal exudate or posterior oropharyngeal erythema. Eyes:      General: No scleral icterus. Right eye: No discharge. Left eye: No discharge. Extraocular Movements: Extraocular movements intact. Conjunctiva/sclera: Conjunctivae normal.      Pupils: Pupils are equal, round, and reactive to light. Neck:      Thyroid: No thyromegaly. Trachea: No tracheal deviation. Cardiovascular:      Rate and Rhythm: Normal rate and regular rhythm. Heart sounds: No murmur heard. No friction rub. No gallop. Pulmonary:      Effort: Pulmonary effort is normal. No respiratory distress.       Breath sounds: Normal breath sounds. No stridor. No wheezing or rhonchi. Abdominal:      General: Bowel sounds are normal. There is no distension. Palpations: Abdomen is soft. Tenderness: There is abdominal tenderness. There is no right CVA tenderness, left CVA tenderness, guarding or rebound. Musculoskeletal:         General: No tenderness or deformity. Normal range of motion. Cervical back: Normal range of motion and neck supple. Lymphadenopathy:      Cervical: No cervical adenopathy. Skin:     General: Skin is warm and dry. Capillary Refill: Capillary refill takes less than 2 seconds. Findings: No erythema or rash. Neurological:      General: No focal deficit present. Mental Status: He is alert and oriented to person, place, and time. Cranial Nerves: No cranial nerve deficit. Motor: No abnormal muscle tone. Deep Tendon Reflexes: Reflexes are normal and symmetric. Reflexes normal.   Psychiatric:         Mood and Affect: Mood normal.         Behavior: Behavior normal.         DIAGNOSTIC RESULTS     EKG: All EKG's are interpreted by the Emergency Department Physician who either signs orCo-signs this chart in the absence of a cardiologist.      RADIOLOGY:   Non-plainfilm images such as CT, Ultrasound and MRI are read by the radiologist. Plain radiographic images are visualized and preliminarily interpreted by the emergency physician with the below findings:      Interpretationper the Radiologist below, if available at the time of this note:    CT ABDOMEN PELVIS W IV CONTRAST Additional Contrast? None   Final Result   No acute inflammatory findings within the abdomen or pelvis to explain left   lower quadrant pain. Mild hepatomegaly and steatosis with regions of focal fatty sparing. Trace grade 1 anterolisthesis L5 on S1 due to chronic bilateral L5 pars   interarticularis defects.                ED BEDSIDE ULTRASOUND:   Performed by ED Physician - none    LABS:  Labs Reviewed   COMPREHENSIVE METABOLIC PANEL - Abnormal; Notable for the following components:       Result Value    Glucose 125 (*)     ALT 83 (*)     All other components within normal limits   COVID-19, RAPID   CBC WITH AUTO DIFFERENTIAL   LIPASE   LACTIC ACID, PLASMA   URINE RT REFLEX TO CULTURE   MICROSCOPIC URINALYSIS       All other labs were within normal range or not returned as of this dictation. EMERGENCY DEPARTMENT COURSE and DIFFERENTIAL DIAGNOSIS/MDM:   Vitals:    Vitals:    09/20/21 1230 09/20/21 1245 09/20/21 1300 09/20/21 1315   BP: (!) 145/80 134/78 (!) 150/86 134/73   Pulse:       Resp:       Temp:       SpO2: 96% 98% 99% 97%   Weight:       Height:             MDM  Maryana Peck is a 28 y.o. male who presents to the emergency department with complaints of abdominal pain; will order CBC CMP and lipase amylase UA lactic acid. Rule out infection, dehydration, electroylte imbalance, colitis, diverticulitis, pancreatitis, cholelithiasis, nephrolithiasis, obstruction, mass, AAA    At this point, the evidence for any other entities in the differential is insufficient to justify any further testing. This was extended the patient. The patient was advised that persistent or worsening symptoms would require further evaluation    ED Course as of Sep 20 2201   Mon Sep 20, 2021   1258 On repeat evaluation, patient is resting comfortably and in no obvious distress. He is getting IV fluids. I updated him that there is nothing acute on the CT imaging to describe his pain that he is feeling. Pain is resolving. Repeat abdominal exam reveals a soft abdomen. We discussed the fatty liver disease a minimally elevated ALT. As well as the chronic changes of the spine. [HH]      ED Course User Index  [HH] [place], [place]     Patient is resting comfortably at this time and in no distress. I have updated patient on current results. We discussed at length the patient's diagnosis.   Patient understands to follow up with primary care provider in the next 2 days. Patient verbalized understanding of this. We went over medications. Strict return warnings were given. Patient will return to the emergency room as needed with new or worsening complaints. Procedures    FINAL IMPRESSION      1. Abdominal pain, unspecified abdominal location    2. Fatty liver    3. Pars defect of lumbar spine        DISPOSITION/PLAN   DISPOSITION        PATIENT REFERRED TO:  [facility]  [address]  [phone]    If symptoms worsen, As needed    SLICK Garcia [facility] [address]  [phone]    Schedule an appointment as soon as possible for a visit in 1 day        DISCHARGE MEDICATIONS:  Discharge Medication List as of 9/20/2021  1:27 PM                 Summation      Patient Course:      ED Medications administered this visit:    Medications   ondansetron (ZOFRAN) injection 4 mg (4 mg IntraVENous Given 9/20/21 1227)   morphine (PF) injection 2 mg (2 mg IntraVENous Given 9/20/21 1225)   0.9 % sodium chloride bolus (0 mLs IntraVENous Stopped 9/20/21 1324)   iopamidol (ISOVUE-370) 76 % injection 75 mL (75 mLs IntraVENous Given 9/20/21 1211)       New Prescriptions from this visit:    Discharge Medication List as of 9/20/2021  1:27 PM          Follow-up:  [facility]  [address]  [phone]    If symptoms worsen, As needed    SLICK Garcia CNP  [facility] [address] [facility] [address] [facility]  [phone]    Schedule an appointment as soon as possible for a visit in 1 day          Final Impression:   1. Abdominal pain, unspecified abdominal location    2. Fatty liver    3.  Pars defect of lumbar spine               (Please note that portions of this note were completed with a voice recognition program.  Efforts were made to edit the dictations but occasionally words are mis-transcribed.)           [place] Klaus Gomez PA-C  09/20/21 2202       Opal Villalpando [place]  09/20/21 2202

## 2021-09-21 ENCOUNTER — HOSPITAL ENCOUNTER (OUTPATIENT)
Dept: ULTRASOUND IMAGING | Age: 31
Discharge: HOME | End: 2021-09-23
Payer: COMMERCIAL

## 2021-09-21 ENCOUNTER — OFFICE VISIT (OUTPATIENT)
Dept: FAMILY MEDICINE CLINIC | Age: 31
End: 2021-09-21
Payer: COMMERCIAL

## 2021-09-21 VITALS
BODY MASS INDEX: 31.14 KG/M2 | HEIGHT: 73 IN | WEIGHT: 235 LBS | SYSTOLIC BLOOD PRESSURE: 148 MMHG | DIASTOLIC BLOOD PRESSURE: 100 MMHG | HEART RATE: 85 BPM | RESPIRATION RATE: 14 BRPM | TEMPERATURE: 98.2 F | OXYGEN SATURATION: 98 %

## 2021-09-21 DIAGNOSIS — R10.32 LLQ PAIN: Primary | ICD-10-CM

## 2021-09-21 DIAGNOSIS — R10.32 LLQ PAIN: ICD-10-CM

## 2021-09-21 PROCEDURE — 93976 VASCULAR STUDY: CPT

## 2021-09-21 PROCEDURE — 99214 OFFICE O/P EST MOD 30 MIN: CPT | Performed by: NURSE PRACTITIONER

## 2021-09-21 NOTE — PATIENT INSTRUCTIONS
SURVEY:    You may be receiving a survey from [employer] regarding your visit today. Please complete the survey to enable us to provide the highest quality of care to you and your family. If you cannot score us a very good on any question, please call the office to discuss how we could of made your experience a very good one. Thank you.

## 2021-09-22 ENCOUNTER — TELEPHONE (OUTPATIENT)
Dept: FAMILY MEDICINE CLINIC | Age: 31
End: 2021-09-22

## 2021-09-22 ASSESSMENT — ENCOUNTER SYMPTOMS
VOMITING: 1
NAUSEA: 1
ABDOMINAL PAIN: 1

## 2021-09-22 NOTE — TELEPHONE ENCOUNTER
I called the patient to discuss unremarkable scrotal/testicular US. He admits continued left lower quadrant abdominal pain that radiates upwards towards his left ribs. Pain is rated 7 out of 10. He states he missed work due to this. He is having 1-2 bowel movements daily that are normal consistency. Denies blood in the stool. He was counseled that if symptoms worsen, he is to present to the ED. I discussed the case with Dr. Rustam Armijo who questions if symptoms could be related to musculoskeletal etiology. I call the patient back. He denies fever. He admits continued hot and cold flashes that have been ongoing x2 days. Hot and cold flashes last several minutes at a time. He does not have a thermometer at home. He did not have a fever when he was in office 9/21/21. Pain is worsened with moving and lying on his left side (\"excruciating\"). Denies alleviating factors. He has been taking naproxen for his chronic neck pain and this did not assist with his LLQ pain. Denies vomiting or nausea. Admits feeling more weak and tired. At this time, I recommend that he continue activity rest.  He may apply heat to the area. Discussed that if his symptoms worsen or if he experiences any fever or chills, he is to present to the ED immediately. He will call tomorrow with an update in his symptoms. Again we reviewed worsening signs and symptoms and when to present to the ED.

## 2021-09-22 NOTE — TELEPHONE ENCOUNTER
The patient came into the office today for his daughters acute visit appointment. Ricki's temperature was evaluated in office due to his hot and cold flash symptoms. His temperature was noted to be 98.5 today.

## 2022-01-17 DIAGNOSIS — Z20.822 COVID-19 RULED OUT: ICD-10-CM

## 2022-01-17 DIAGNOSIS — M79.606 PAIN OF LOWER EXTREMITY, UNSPECIFIED LATERALITY: ICD-10-CM

## 2022-01-17 DIAGNOSIS — R51.9 NONINTRACTABLE HEADACHE, UNSPECIFIED CHRONICITY PATTERN, UNSPECIFIED HEADACHE TYPE: Primary | ICD-10-CM

## 2022-01-18 ENCOUNTER — HOSPITAL ENCOUNTER (OUTPATIENT)
Dept: LAB | Age: 32
Setting detail: SPECIMEN
Discharge: HOME | End: 2022-01-18
Payer: COMMERCIAL

## 2022-01-18 DIAGNOSIS — Z20.822 COVID-19 RULED OUT: ICD-10-CM

## 2022-01-18 DIAGNOSIS — R51.9 NONINTRACTABLE HEADACHE, UNSPECIFIED CHRONICITY PATTERN, UNSPECIFIED HEADACHE TYPE: ICD-10-CM

## 2022-01-18 DIAGNOSIS — M79.606 PAIN OF LOWER EXTREMITY, UNSPECIFIED LATERALITY: ICD-10-CM

## 2022-01-18 PROCEDURE — U0005 INFEC AGEN DETEC AMPLI PROBE: HCPCS

## 2022-01-18 PROCEDURE — U0003 INFECTIOUS AGENT DETECTION BY NUCLEIC ACID (DNA OR RNA); SEVERE ACUTE RESPIRATORY SYNDROME CORONAVIRUS 2 (SARS-COV-2) (CORONAVIRUS DISEASE [COVID-19]), AMPLIFIED PROBE TECHNIQUE, MAKING USE OF HIGH THROUGHPUT TECHNOLOGIES AS DESCRIBED BY CMS-2020-01-R: HCPCS

## 2022-01-18 PROCEDURE — C9803 HOPD COVID-19 SPEC COLLECT: HCPCS

## 2022-01-19 LAB
SARS-COV-2: NORMAL
SARS-COV-2: NOT DETECTED
SOURCE: NORMAL

## 2022-05-04 ENCOUNTER — APPOINTMENT (OUTPATIENT)
Dept: CT IMAGING | Age: 32
End: 2022-05-04
Payer: COMMERCIAL

## 2022-05-04 ENCOUNTER — HOSPITAL ENCOUNTER (EMERGENCY)
Age: 32
Discharge: HOME | End: 2022-05-04
Attending: STUDENT IN AN ORGANIZED HEALTH CARE EDUCATION/TRAINING PROGRAM
Payer: COMMERCIAL

## 2022-05-04 VITALS
TEMPERATURE: 97.5 F | HEART RATE: 90 BPM | SYSTOLIC BLOOD PRESSURE: 122 MMHG | RESPIRATION RATE: 16 BRPM | WEIGHT: 235 LBS | DIASTOLIC BLOOD PRESSURE: 82 MMHG | OXYGEN SATURATION: 98 % | HEIGHT: 73 IN | BODY MASS INDEX: 31.14 KG/M2

## 2022-05-04 DIAGNOSIS — R10.30 LOWER ABDOMINAL PAIN: ICD-10-CM

## 2022-05-04 DIAGNOSIS — K64.4 EXTERNAL HEMORRHOID: ICD-10-CM

## 2022-05-04 DIAGNOSIS — K62.5 RECTAL BLEEDING: Primary | ICD-10-CM

## 2022-05-04 LAB
-: NORMAL
ABSOLUTE EOS #: 0.12 K/UL (ref 0–0.44)
ABSOLUTE IMMATURE GRANULOCYTE: 0.03 K/UL (ref 0–0.3)
ABSOLUTE LYMPH #: 2.02 K/UL (ref 1.1–3.7)
ABSOLUTE MONO #: 0.43 K/UL (ref 0.1–1.2)
ALBUMIN SERPL-MCNC: 4.4 G/DL (ref 3.5–5.2)
ALBUMIN/GLOBULIN RATIO: 1.5 (ref 1–2.5)
ALP BLD-CCNC: 91 U/L (ref 40–129)
ALT SERPL-CCNC: 92 U/L (ref 5–41)
ANION GAP SERPL CALCULATED.3IONS-SCNC: 10 MMOL/L (ref 9–17)
AST SERPL-CCNC: 37 U/L
BASOPHILS # BLD: 1 % (ref 0–2)
BASOPHILS ABSOLUTE: 0.03 K/UL (ref 0–0.2)
BILIRUB SERPL-MCNC: 0.22 MG/DL (ref 0.3–1.2)
BILIRUBIN URINE: NEGATIVE
BUN BLDV-MCNC: 11 MG/DL (ref 6–20)
BUN/CREAT BLD: 15 (ref 9–20)
CALCIUM SERPL-MCNC: 9.5 MG/DL (ref 8.6–10.4)
CHLORIDE BLD-SCNC: 105 MMOL/L (ref 98–107)
CO2: 23 MMOL/L (ref 20–31)
COLOR: YELLOW
CREAT SERPL-MCNC: 0.73 MG/DL (ref 0.7–1.2)
EOSINOPHILS RELATIVE PERCENT: 2 % (ref 1–4)
EPITHELIAL CELLS UA: NORMAL /HPF (ref 0–5)
FLU A ANTIGEN: NEGATIVE
FLU B ANTIGEN: NEGATIVE
GFR AFRICAN AMERICAN: >60 ML/MIN
GFR NON-AFRICAN AMERICAN: >60 ML/MIN
GFR SERPL CREATININE-BSD FRML MDRD: ABNORMAL ML/MIN/{1.73_M2}
GFR SERPL CREATININE-BSD FRML MDRD: ABNORMAL ML/MIN/{1.73_M2}
GLUCOSE BLD-MCNC: 109 MG/DL (ref 70–99)
GLUCOSE URINE: NEGATIVE
HCT VFR BLD CALC: 44.6 % (ref 40.7–50.3)
HEMOGLOBIN: 15 G/DL (ref 13–17)
IMMATURE GRANULOCYTES: 1 %
INR BLD: 1.1
KETONES, URINE: NEGATIVE
LACTIC ACID: 1.6 MMOL/L (ref 0.5–2.2)
LEUKOCYTE ESTERASE, URINE: NEGATIVE
LIPASE: 31 U/L (ref 13–60)
LYMPHOCYTES # BLD: 32 % (ref 24–43)
MCH RBC QN AUTO: 29.8 PG (ref 25.2–33.5)
MCHC RBC AUTO-ENTMCNC: 33.6 G/DL (ref 28.4–34.8)
MCV RBC AUTO: 88.5 FL (ref 82.6–102.9)
MONOCYTES # BLD: 7 % (ref 3–12)
NITRITE, URINE: NEGATIVE
NRBC AUTOMATED: 0 PER 100 WBC
PARTIAL THROMBOPLASTIN TIME: 30.4 SEC (ref 26.8–34.8)
PDW BLD-RTO: 12.8 % (ref 11.8–14.4)
PH UA: 6 (ref 5–9)
PLATELET # BLD: 211 K/UL (ref 138–453)
PMV BLD AUTO: 9.2 FL (ref 8.1–13.5)
POTASSIUM SERPL-SCNC: 4 MMOL/L (ref 3.7–5.3)
PROTEIN UA: NEGATIVE
PROTHROMBIN TIME: 14.2 SEC (ref 11.5–14.2)
RBC # BLD: 5.04 M/UL (ref 4.21–5.77)
RBC UA: NORMAL /HPF (ref 0–2)
SARS-COV-2, RAPID: NOT DETECTED
SEG NEUTROPHILS: 57 % (ref 36–65)
SEGMENTED NEUTROPHILS ABSOLUTE COUNT: 3.76 K/UL (ref 1.5–8.1)
SODIUM BLD-SCNC: 138 MMOL/L (ref 135–144)
SPECIFIC GRAVITY UA: 1.01 (ref 1.01–1.02)
SPECIMEN DESCRIPTION: NORMAL
TOTAL PROTEIN: 7.3 G/DL (ref 6.4–8.3)
TURBIDITY: CLEAR
URINE HGB: NEGATIVE
UROBILINOGEN, URINE: NORMAL
WBC # BLD: 6.4 K/UL (ref 3.5–11.3)
WBC UA: NORMAL /HPF (ref 0–5)

## 2022-05-04 PROCEDURE — 74174 CTA ABD&PLVS W/CONTRAST: CPT

## 2022-05-04 PROCEDURE — 80053 COMPREHEN METABOLIC PANEL: CPT

## 2022-05-04 PROCEDURE — A4216 STERILE WATER/SALINE, 10 ML: HCPCS | Performed by: STUDENT IN AN ORGANIZED HEALTH CARE EDUCATION/TRAINING PROGRAM

## 2022-05-04 PROCEDURE — 96375 TX/PRO/DX INJ NEW DRUG ADDON: CPT

## 2022-05-04 PROCEDURE — 70450 CT HEAD/BRAIN W/O DYE: CPT

## 2022-05-04 PROCEDURE — 85610 PROTHROMBIN TIME: CPT

## 2022-05-04 PROCEDURE — 2500000003 HC RX 250 WO HCPCS: Performed by: STUDENT IN AN ORGANIZED HEALTH CARE EDUCATION/TRAINING PROGRAM

## 2022-05-04 PROCEDURE — 96374 THER/PROPH/DIAG INJ IV PUSH: CPT

## 2022-05-04 PROCEDURE — 2580000003 HC RX 258: Performed by: STUDENT IN AN ORGANIZED HEALTH CARE EDUCATION/TRAINING PROGRAM

## 2022-05-04 PROCEDURE — 83605 ASSAY OF LACTIC ACID: CPT

## 2022-05-04 PROCEDURE — 96361 HYDRATE IV INFUSION ADD-ON: CPT

## 2022-05-04 PROCEDURE — 87804 INFLUENZA ASSAY W/OPTIC: CPT

## 2022-05-04 PROCEDURE — 6360000002 HC RX W HCPCS: Performed by: STUDENT IN AN ORGANIZED HEALTH CARE EDUCATION/TRAINING PROGRAM

## 2022-05-04 PROCEDURE — 81001 URINALYSIS AUTO W/SCOPE: CPT

## 2022-05-04 PROCEDURE — 85730 THROMBOPLASTIN TIME PARTIAL: CPT

## 2022-05-04 PROCEDURE — 99285 EMERGENCY DEPT VISIT HI MDM: CPT

## 2022-05-04 PROCEDURE — 87635 SARS-COV-2 COVID-19 AMP PRB: CPT

## 2022-05-04 PROCEDURE — 83690 ASSAY OF LIPASE: CPT

## 2022-05-04 PROCEDURE — 85025 COMPLETE CBC W/AUTO DIFF WBC: CPT

## 2022-05-04 PROCEDURE — 6360000004 HC RX CONTRAST MEDICATION: Performed by: STUDENT IN AN ORGANIZED HEALTH CARE EDUCATION/TRAINING PROGRAM

## 2022-05-04 RX ORDER — SODIUM CHLORIDE, SODIUM LACTATE, POTASSIUM CHLORIDE, CALCIUM CHLORIDE 600; 310; 30; 20 MG/100ML; MG/100ML; MG/100ML; MG/100ML
1000 INJECTION, SOLUTION INTRAVENOUS ONCE
Status: COMPLETED | OUTPATIENT
Start: 2022-05-04 | End: 2022-05-04

## 2022-05-04 RX ORDER — ONDANSETRON 2 MG/ML
4 INJECTION INTRAMUSCULAR; INTRAVENOUS ONCE
Status: COMPLETED | OUTPATIENT
Start: 2022-05-04 | End: 2022-05-04

## 2022-05-04 RX ADMIN — SODIUM CHLORIDE, POTASSIUM CHLORIDE, SODIUM LACTATE AND CALCIUM CHLORIDE 1000 ML: 600; 310; 30; 20 INJECTION, SOLUTION INTRAVENOUS at 08:35

## 2022-05-04 RX ADMIN — IOPAMIDOL 75 ML: 755 INJECTION, SOLUTION INTRAVENOUS at 09:46

## 2022-05-04 RX ADMIN — FAMOTIDINE 20 MG: 10 INJECTION INTRAVENOUS at 09:26

## 2022-05-04 RX ADMIN — ONDANSETRON 4 MG: 2 INJECTION INTRAMUSCULAR; INTRAVENOUS at 09:27

## 2022-05-04 ASSESSMENT — ENCOUNTER SYMPTOMS
VOMITING: 0
BLOOD IN STOOL: 1
NAUSEA: 0
PHOTOPHOBIA: 0
EYE REDNESS: 0
SORE THROAT: 0
CONSTIPATION: 0
DIARRHEA: 0
SHORTNESS OF BREATH: 0
ABDOMINAL PAIN: 1
RHINORRHEA: 0

## 2022-05-04 NOTE — ED PROVIDER NOTES
[facility]  Emergency Department Encounter  EmergencyMedicine      Pt Name:Ricki Best  MRN: [id number]  Birthdate 1990  Date of evaluation: 5/4/22  PCP:  Ileana Dakins, APRN - CNP    CHIEF COMPLAINT       Chief Complaint   Patient presents with    Abdominal Pain     bilateral lower onset this am--hurts more on left than the right    Rectal Bleeding     onset around 5:30--bright red in color    Headache     sent home from work yesterday, was asking the same question over and over after getting home and doesn't remember that       HISTORY OF PRESENT ILLNESS  (Location/Symptom, Timing/Onset, Context/Setting, Quality, Duration, Modifying Factors, Severity.)      Destinee Sheppard is a 28 y.o. male who presents with abdominal pain and hematochezia. Has any fevers any chills any chest pain shortness of breath or cough. Patient also had an episode of headache yesterday. States that he was repeating himself yesterday and he was not worse headache of life not thunderclap in nature. He is no longer having a headache at this time. No trauma. No urinary symptoms he is tolerating p.o. intake. No history of abdominal surgeries. Onset: yesterday headache, abdominal pain today  Provocation: unclear  Quality: ache  Radiation: none  Severity: mild  Timing: constant  Interventions: none    PAST MEDICAL / SURGICAL / SOCIAL / FAMILY HISTORY     Past medical history, surgical history, social history, family history as well as patient's allergies and home medications were reviewed. has no past medical history on file.   Problem Noted Date   Irritable bowel syndrome with diarrhea 10/15/2018   Cervical disc disorder of mid-cervical region 02/08/2018   Spinal instability, cervical 01/22/2018   [place] neck deformity of cervical spine 12/15/2017   Overview:     Formatting of this note might be different from the original.  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 TESTICLE SURGERY   Left removal     ANKLE ARTHROSCOPY highschool Left      WISDOM TOOTH EXTRACTION age 13        LYMPH NODE BIOPSY 1/20/2017 Left Procedure: LEFT GROIN NODE BIOPSY; Surgeon: Bradley Guardado MD; Location: [facility]; Service: General; Laterality: Left;     NASAL ENDOSCOPY 1/20/2017 Bilateral Procedure: ENDOSCOPY NASAL WITH BIOPSY; Surgeon: Charls Goldberg, MD; Location: [facility]; Service: Ear, Nose, and Throat; Laterality: [address] 1/30/2017 N/A Procedure: LAMINECTOMY CERVICAL POSTERIOR DECOMPRESSIVE C2-5; Surgeon: Kenyetta Mcfadden MD; Location: [facility]; Service: Neurosurgery; Laterality: N/A;     ANTERIOR CERVICAL DISCECTOMY W/ FUSION 2/8/2018 N/A Procedure: DISKECTOMY CERVICAL FUSION ANTERIOR C4-5, C5-6; Surgeon: Kenyetta Mcfadden MD; Location: [facility]; Service: Neurosurgery;  Laterality: N/A;    Medical devices from this surgery are in the Medical Devices section.           Social History     Socioeconomic History    Marital status:      Spouse name: Not on file    Number of children: Not on file    Years of education: Not on file    Highest education level: Not on file   Occupational History    Not on file   Tobacco Use    Smoking status: Former Smoker     Packs/day: 0.50     Types: Cigarettes    Smokeless tobacco: Never Used   Substance and Sexual Activity    Alcohol use: No    Drug use: No    Sexual activity: Not on file   Other Topics Concern    Not on file   Social History Narrative    Not on file     Social Determinants of Health     Financial Resource Strain:     Difficulty of Paying Living Expenses: Not on file   Food Insecurity:     Worried About Running Out of Food in the Last Year: Not on file    Jozef of Food in the Last Year: Not on file   Transportation Needs:     Lack of Transportation (Medical): Not on file    Lack of Transportation (Non-Medical): Not on file   Physical Activity:     Days of Exercise per Week: Not on file    Minutes of Exercise per Session: Not on file   Stress:     Feeling of Stress : Not on file   Social Connections:     Frequency of Communication with Friends and Family: Not on file    Frequency of Social Gatherings with Friends and Family: Not on file    Attends Tenri[employer] Services: Not on file    Active Member of [employer] or Organizations: Not on file    Attends Club or Organization Meetings: Not on file    Marital Status: Not on file   Intimate Partner Violence:     Fear of Current or Ex-Partner: Not on file    Emotionally Abused: Not on file    Physically Abused: Not on file    Sexually Abused: Not on file   Housing Stability:     Unable to Pay for Housing in the Last Year: Not on file    Number of [place] in the Last Year: Not on file    Unstable Housing in the Last Year: Not on file       Family History   Problem Relation Age of Onset    Deep Vein Thrombosis Sister     Diabetes Maternal Grandmother     Bleeding Prob Other        Allergies:  Vancomycin, Bactrim [sulfamethoxazole-trimethoprim], Pcn [penicillins], and Sulfa antibiotics    Home Medications:  Prior to Admission medications    Medication Sig Start Date End Date Taking? Authorizing Provider   acetaminophen (TYLENOL) 500 MG tablet Take 1,000 mg by mouth every 6 hours as needed for Pain  9/20/21  Historical Provider, MD       REVIEW OF SYSTEMS    (2-9 systems for level 4, 10 or more for level 5)      Review of Systems   Constitutional: Negative for fever. HENT: Negative for congestion, rhinorrhea and sore throat. Eyes: Negative for photophobia and redness.    Respiratory: Negative for shortness of breath. Cardiovascular: Negative for chest pain, palpitations and leg swelling. Gastrointestinal: Positive for abdominal pain and blood in stool. Negative for constipation, diarrhea, nausea and vomiting. Genitourinary: Negative for dysuria and hematuria. Musculoskeletal: Negative for gait problem. Skin: Negative for pallor, rash and wound. Allergic/Immunologic: Negative for environmental allergies and food allergies. Neurological: Positive for headaches. Negative for tremors, seizures, syncope, facial asymmetry, speech difficulty, weakness, light-headedness and numbness. Hematological: Negative for adenopathy. Does not bruise/bleed easily. Psychiatric/Behavioral: Negative for confusion. PHYSICAL EXAM   (up to 7 for level 4, 8 or more for level 5)      INITIAL VITALS:   /82   Pulse 90   Temp 97.5 °F (36.4 °C) (Tympanic)   Resp 16   Ht 6' 1\" (1.854 m)   Wt 235 lb (106.6 kg)   SpO2 98%   BMI 31.00 kg/m²     Physical Exam  Vitals and nursing note reviewed. Constitutional:       General: He is not in acute distress. Appearance: Normal appearance. He is obese. He is not ill-appearing, toxic-appearing or diaphoretic. HENT:      Head: Normocephalic and atraumatic. Right Ear: External ear normal.      Left Ear: External ear normal.      Nose: Nose normal.      Mouth/Throat:      Pharynx: Oropharynx is clear. Eyes:      General: No scleral icterus. Conjunctiva/sclera: Conjunctivae normal.      Pupils: Pupils are equal, round, and reactive to light. Comments: No pallor or icterus   Cardiovascular:      Rate and Rhythm: Normal rate. Pulses: Normal pulses. Comments: Pulses equal and symmetric throughout  Pulmonary:      Effort: Pulmonary effort is normal.   Abdominal:      General: Bowel sounds are normal. There is no distension. Palpations: Abdomen is soft. There is no mass. Tenderness:  There is no right CVA tenderness, left CVA tenderness, guarding or rebound. Comments: No pulsatile abdominal pain. Genitourinary:     Comments: fobt positive+ external hemorrhoids  Musculoskeletal:         General: Normal range of motion. Cervical back: Normal range of motion. Skin:     General: Skin is warm. Capillary Refill: Capillary refill takes less than 2 seconds. Neurological:      General: No focal deficit present. Mental Status: He is alert and oriented to person, place, and time. Psychiatric:         Mood and Affect: Mood normal.          DIFFERENTIAL  DIAGNOSIS     PLAN (LABS / IMAGING / EKG):  Orders Placed This Encounter   Procedures    COVID-19, Rapid    Rapid influenza A/B antigens    CTA ABDOMEN PELVIS W CONTRAST    CT Head WO Contrast    CBC with Auto Differential    Comprehensive Metabolic Panel    Lipase    APTT    Protime-INR    Lactic Acid    Urinalysis with Reflex to Culture    Microscopic Urinalysis    Vital signs       MEDICATIONS ORDERED:  Orders Placed This Encounter   Medications    ondansetron (ZOFRAN) injection 4 mg    lactated ringers infusion 1,000 mL    famotidine (PEPCID) 20 mg in sodium chloride (PF) 10 mL injection    iopamidol (ISOVUE-370) 76 % injection 75 mL       DDX: gi bleed, hemorrhoid, intracranial process, viral illness    DIAGNOSTIC RESULTS / EMERGENCY DEPARTMENT COURSE / MDM   LAB RESULTS:  Results for orders placed or performed during the hospital encounter of 05/04/22   COVID-19, Rapid    Specimen: Nasopharyngeal Swab   Result Value Ref Range    Specimen Description . NASOPHARYNGEAL SWAB     SARS-CoV-2, Rapid Not Detected Not Detected   Rapid influenza A/B antigens    Specimen: Nasopharyngeal   Result Value Ref Range    Flu A Antigen NEGATIVE NEGATIVE    Flu B Antigen NEGATIVE NEGATIVE   CBC with Auto Differential   Result Value Ref Range    WBC 6.4 3.5 - 11.3 k/uL    RBC 5.04 4.21 - 5.77 m/uL    Hemoglobin 15.0 13.0 - 17.0 g/dL    Hematocrit 44.6 40.7 - 50.3 %    MCV 88.5 82.6 - 102.9 fL    MCH 29.8 25.2 - 33.5 pg    MCHC 33.6 28.4 - 34.8 g/dL    RDW 12.8 11.8 - 14.4 %    Platelets 431 187 - 311 k/uL    MPV 9.2 8.1 - 13.5 fL    NRBC Automated 0.0 0.0 per 100 WBC    Seg Neutrophils 57 36 - 65 %    Lymphocytes 32 24 - 43 %    Monocytes 7 3 - 12 %    Eosinophils % 2 1 - 4 %    Basophils 1 0 - 2 %    Immature Granulocytes 1 (H) 0 %    Segs Absolute 3.76 1.50 - 8.10 k/uL    Absolute Lymph # 2.02 1.10 - 3.70 k/uL    Absolute Mono # 0.43 0.10 - 1.20 k/uL    Absolute Eos # 0.12 0.00 - 0.44 k/uL    Basophils Absolute 0.03 0.00 - 0.20 k/uL    Absolute Immature Granulocyte 0.03 0.00 - 0.30 k/uL   Comprehensive Metabolic Panel   Result Value Ref Range    Glucose 109 (H) 70 - 99 mg/dL    BUN 11 6 - 20 mg/dL    CREATININE 0.73 0.70 - 1.20 mg/dL    Bun/Cre Ratio 15 9 - 20    Calcium 9.5 8.6 - 10.4 mg/dL    Sodium 138 135 - 144 mmol/L    Potassium 4.0 3.7 - 5.3 mmol/L    Chloride 105 98 - 107 mmol/L    CO2 23 20 - 31 mmol/L    Anion Gap 10 9 - 17 mmol/L    Alkaline Phosphatase 91 40 - 129 U/L    ALT 92 (H) 5 - 41 U/L    AST 37 <40 U/L    Total Bilirubin 0.22 (L) 0.3 - 1.2 mg/dL    Total Protein 7.3 6.4 - 8.3 g/dL    Albumin 4.4 3.5 - 5.2 g/dL    Albumin/Globulin Ratio 1.5 1.0 - 2.5    GFR Non-African American >60 >60 mL/min    GFR African American >60 >60 mL/min    GFR Comment          GFR Staging         Lipase   Result Value Ref Range    Lipase 31 13 - 60 U/L   APTT   Result Value Ref Range    PTT 30.4 26.8 - 34.8 sec   Protime-INR   Result Value Ref Range    Protime 14.2 11.5 - 14.2 sec    INR 1.1    Lactic Acid   Result Value Ref Range    Lactic Acid 1.6 0.5 - 2.2 mmol/L   Urinalysis with Reflex to Culture    Specimen: Urine   Result Value Ref Range    Color, UA Yellow Yellow    Turbidity UA Clear Clear    Glucose, Ur NEGATIVE NEGATIVE    Bilirubin Urine NEGATIVE NEGATIVE    Ketones, Urine NEGATIVE NEGATIVE    Specific [place], UA 1.010 1.010 - 1.020    Urine Hgb NEGATIVE NEGATIVE    pH, UA 6.0 5.0 - 9.0    Protein, UA NEGATIVE NEGATIVE    Urobilinogen, Urine Normal Normal    Nitrite, Urine NEGATIVE NEGATIVE    Leukocyte Esterase, Urine NEGATIVE NEGATIVE   Microscopic Urinalysis   Result Value Ref Range    -          WBC, UA None 0 - 5 /HPF    RBC, UA None 0 - 2 /HPF    Epithelial Cells UA None 0 - 5 /HPF       IMPRESSION: Is alert nontoxic 28-year-old male no acute distress is resting comfortably on the bed complaining of lower abdominal discomfort began this morning associated with an episode of hematochezia when having a bowel movement no melena no vomiting no nausea headache is mild in nature no nuchal rigidity or meningeal signs no fevers tolerating oral intake no urinary symptoms no myalgias the urine, labs, IV fluids supportive care will include CTA and CT head    RADIOLOGY:  CT Head WO Contrast    Result Date: 5/4/2022  EXAMINATION: CT OF THE HEAD WITHOUT CONTRAST  5/4/2022 9:43 am TECHNIQUE: CT of the head was performed without the administration of intravenous contrast. Dose modulation, iterative reconstruction, and/or weight based adjustment of the mA/kV was utilized to reduce the radiation dose to as low as reasonably achievable. COMPARISON: 01/16/2017 HISTORY: ORDERING SYSTEM PROVIDED HISTORY: ams TECHNOLOGIST PROVIDED HISTORY: ams Decision Support Exception - unselect if not a suspected or confirmed emergency medical condition->Emergency Medical Condition (MA) FINDINGS: BRAIN/VENTRICLES: There is no acute intracranial hemorrhage, mass effect or midline shift. No abnormal extra-axial fluid collection. The gray-white differentiation is maintained without evidence of an acute infarct. There is no evidence of hydrocephalus. ORBITS: The visualized portion of the orbits demonstrate no acute abnormality. SINUSES: The visualized paranasal sinuses and mastoid air cells demonstrate no acute abnormality. SOFT TISSUES/SKULL:  No acute abnormality of the visualized skull or soft tissues.      No acute intracranial abnormality. RECOMMENDATIONS: Unavailable     CTA ABDOMEN PELVIS W CONTRAST    Result Date: 5/4/2022  EXAMINATION: CTA OF THE ABDOMEN AND PELVIS WITH CONTRAST 5/4/2022 9:45 am: TECHNIQUE: CTA of the abdomen and pelvis was performed with the administration of intravenous contrast. Multiplanar reformatted images are provided for review. MIP images are provided for review. Dose modulation, iterative reconstruction, and/or weight based adjustment of the mA/kV was utilized to reduce the radiation dose to as low as reasonably achievable. COMPARISON: None. HISTORY: ORDERING SYSTEM PROVIDED HISTORY: abdominal pain rectal bleeding TECHNOLOGIST PROVIDED HISTORY: abdominal pain rectal bleeding Decision Support Exception - unselect if not a suspected or confirmed emergency medical condition->Emergency Medical Condition (MA) FINDINGS: CTA ABDOMEN/PELVIS: Lower Chest: Visualized portions of the lower thorax are unremarkable. Organs: Diffusely low attenuation of the liver with respect to spleen is compatible with fatty infiltration. Arterial phase imaging of the spleen, pancreas, adrenal glands, kidneys, and gallbladder within normal limits. GI/Bowel: No bowel obstruction. Normal appendix. No appreciable abnormality involving bowel. Pelvis: Urinary bladder and prostate gland within normal limits. Peritoneum/Retroperitoneum: No free air, fluid, or lymphadenopathy. Vascular: Normal caliber abdominal aorta and branching vessels. Bones/Soft Tissues: No acute osseous abnormality. No CT evidence of acute intra-abdominal process. Fatty liver.  RECOMMENDATIONS: Unavailable       EKG      All EKG's are interpreted by the Emergency Department Physician who either signs or Co-signs this chart in the absence of a cardiologist.    EMERGENCY DEPARTMENT COURSE:  ED Course as of 05/04/22 1409   Wed May 04, 2022   [address] Ct reviewed, unremarkable [BG]   200 Female rn chaperoned rectal showed external hemorrhoids fobt positive normal tone no fissures [BG]      ED Course User Index  [BG] Melissa Lee DO           PROCEDURES:      CONSULTS:  None    CRITICAL CARE:    FINAL IMPRESSION      1. Rectal bleeding    2. Lower abdominal pain    3. External hemorrhoid          DISPOSITION / PLAN     DISPOSITION Decision To Discharge 05/04/2022 11:09:57 AM      PATIENT REFERRED TO:  Jia Valentin [id number]  [phone]    Call today  Follow-up and reevaluation in 1 to 2 days.     [facility]  [address]  [phone]    If symptoms worsen, As needed      DISCHARGE MEDICATIONS:  Discharge Medication List as of 5/4/2022 11:10 AM          Stuart Lerma DO MS, RD  Emergency Medicine        (Please note that portions of thisnote were completed with a voice recognition program.  Efforts were made to edit the dictations but occasionally words are mis-transcribed.)        Melissa Lee DO  Resident  05/04/22 [id number]

## 2022-05-04 NOTE — Clinical Note
Ander Casas was seen and treated in our emergency department on 5/4/2022. He may return to work on 05/05/2022. If you have any questions or concerns, please don't hesitate to call.       Melissa Lee, DO

## 2022-05-06 ENCOUNTER — TELEPHONE (OUTPATIENT)
Dept: FAMILY MEDICINE CLINIC | Age: 32
End: 2022-05-06

## 2022-05-06 NOTE — TELEPHONE ENCOUNTER
[facility]) ED Follow up Call    Reason for ED visit:  ABDOMINAL PAIN     5/6/2022     Hi Ludivina Pollo , this is KAMILAH from Dr. Baron Marshall office, just calling to see how you are doing after your recent ED visit. Did you receive discharge instructions? Yes  Do you understand the discharge instructions? Yes  Did the ED give you any new prescriptions? No:   Were you able to fill your prescriptions? No:       Do you have one of our red, yellow and green  Zone sheets that help you to determine when you should go to the ED? No      Do you need or want to make a follow up appt with your PCP? No    Do you have any further needs in the home, e.g. equipment? No        FU appts/Provider:    No future appointments.

## 2022-07-21 ENCOUNTER — HOSPITAL ENCOUNTER (EMERGENCY)
Age: 32
Discharge: HOME | End: 2022-07-21
Payer: COMMERCIAL

## 2022-07-21 VITALS
DIASTOLIC BLOOD PRESSURE: 91 MMHG | HEART RATE: 66 BPM | RESPIRATION RATE: 16 BRPM | TEMPERATURE: 97.5 F | WEIGHT: 235 LBS | HEIGHT: 73 IN | SYSTOLIC BLOOD PRESSURE: 152 MMHG | BODY MASS INDEX: 31.14 KG/M2 | OXYGEN SATURATION: 99 %

## 2022-07-21 DIAGNOSIS — S01.01XA LACERATION OF SCALP, INITIAL ENCOUNTER: Primary | ICD-10-CM

## 2022-07-21 PROCEDURE — 90715 TDAP VACCINE 7 YRS/> IM: CPT | Performed by: NURSE PRACTITIONER

## 2022-07-21 PROCEDURE — 6370000000 HC RX 637 (ALT 250 FOR IP): Performed by: NURSE PRACTITIONER

## 2022-07-21 PROCEDURE — 90471 IMMUNIZATION ADMIN: CPT | Performed by: NURSE PRACTITIONER

## 2022-07-21 PROCEDURE — 12031 INTMD RPR S/A/T/EXT 2.5 CM/<: CPT

## 2022-07-21 PROCEDURE — 99284 EMERGENCY DEPT VISIT MOD MDM: CPT

## 2022-07-21 PROCEDURE — 6360000002 HC RX W HCPCS: Performed by: NURSE PRACTITIONER

## 2022-07-21 RX ORDER — IBUPROFEN 200 MG
200 TABLET ORAL EVERY 6 HOURS PRN
COMMUNITY

## 2022-07-21 RX ADMIN — TETANUS TOXOID, REDUCED DIPHTHERIA TOXOID AND ACELLULAR PERTUSSIS VACCINE, ADSORBED 0.5 ML: 5; 2.5; 8; 8; 2.5 SUSPENSION INTRAMUSCULAR at 11:08

## 2022-07-21 RX ADMIN — Medication 3 ML: at 11:42

## 2022-07-21 ASSESSMENT — PAIN SCALES - GENERAL: PAINLEVEL_OUTOF10: 4

## 2022-07-21 ASSESSMENT — PAIN DESCRIPTION - LOCATION: LOCATION: HEAD

## 2022-07-21 ASSESSMENT — PAIN - FUNCTIONAL ASSESSMENT
PAIN_FUNCTIONAL_ASSESSMENT: 0-10
PAIN_FUNCTIONAL_ASSESSMENT: NONE - DENIES PAIN

## 2022-07-21 ASSESSMENT — ENCOUNTER SYMPTOMS
GASTROINTESTINAL NEGATIVE: 1
RESPIRATORY NEGATIVE: 1

## 2022-07-21 ASSESSMENT — PAIN DESCRIPTION - DESCRIPTORS: DESCRIPTORS: THROBBING

## 2022-07-21 NOTE — ED PROVIDER NOTES
[facility]  EMERGENCY DEPARTMENT ENCOUNTER      Pt Name: Tawana Berman  MRN: [id number]  [place] 1990  Date of evaluation: 7/21/2022  Provider: SLICK Lauren CNP    CHIEF COMPLAINT       Chief Complaint   Patient presents with    Head Laceration     Pt reports hitting head on truck door around 0830. Denies LOC         HISTORY OF PRESENT ILLNESS   (Location/Symptom, Timing/Onset, Context/Setting, Quality, Duration, Modifying Factors, Severity)  Note limiting factors. Tawana Berman is a 28 y.o. male who presents to the emergency department with laceration to his left posterior scalp after hitting his head on a truck door at work around 830am today. Last tetanus 2010. Bleeding controlled. Pt. States he cleaned it out at work with antiseptic cleanser prior to coming to ER. Nursing Notes were reviewed. REVIEW OF SYSTEMS    (2-9 systems for level 4, 10 or more for level 5)     Review of Systems   Constitutional: Negative. HENT: Negative. Respiratory: Negative. Cardiovascular: Negative. Gastrointestinal: Negative. Musculoskeletal: Negative. Skin:  Positive for wound. Neurological: Negative. Except as noted above the remainder of the review of systems was reviewed and negative. PAST MEDICAL HISTORY   History reviewed. No pertinent past medical history.       SURGICAL HISTORY       Past Surgical History:   Procedure Laterality Date    ANKLE SURGERY  2006    SPINAL CORD DECOMPRESSION  2017    SPINAL FUSION  2018    TESTICLE REMOVAL  03/2016    left    WISDOM TOOTH EXTRACTION  2009         CURRENT MEDICATIONS       Previous Medications    IBUPROFEN (ADVIL;MOTRIN) 200 MG TABLET    Take 200 mg by mouth every 6 hours as needed for Pain       ALLERGIES     Vancomycin, Bactrim [sulfamethoxazole-trimethoprim], Pcn [penicillins], and Sulfa antibiotics    FAMILY HISTORY       Family History   Problem Relation Age of Onset    Deep Vein Thrombosis Sister Diabetes Maternal Grandmother     Bleeding Prob Other           SOCIAL HISTORY       Social History     Socioeconomic History    Marital status:      Spouse name: None    Number of children: None    Years of education: None    Highest education level: None   Tobacco Use    Smoking status: Former     Packs/day: 0.50     Types: Cigarettes    Smokeless tobacco: Never   Substance and Sexual Activity    Alcohol use: No    Drug use: No       SCREENINGS         Jeevan Coma Scale  Eye Opening: Spontaneous  Best Verbal Response: Oriented  Best Motor Response: Obeys commands  Jeevan Coma Scale Score: 15                     CIWA Assessment  BP: (!) 152/91  Heart Rate: 66                 PHYSICAL EXAM    (up to 7 for level 4, 8 or more for level 5)     ED Triage Vitals [07/21/22 1050]   BP Temp Temp Source Heart Rate Resp SpO2 Height Weight   (!) 152/91 97.5 °F (36.4 °C) Tympanic 66 16 99 % 6' 1\" (1.854 m) 235 lb (106.6 kg)       Physical Exam  Constitutional:       General: He is not in acute distress. Appearance: Normal appearance. He is not toxic-appearing. Cardiovascular:      Rate and Rhythm: Normal rate and regular rhythm. Pulmonary:      Effort: Pulmonary effort is normal. No respiratory distress. Abdominal:      General: Abdomen is flat. There is no distension. Musculoskeletal:         General: No swelling or deformity. Skin:     General: Skin is warm and dry. Comments: 2cm left posterior scalp laceration   Neurological:      General: No focal deficit present. Mental Status: He is alert and oriented to person, place, and time.    Psychiatric:         Mood and Affect: Mood normal.         Behavior: Behavior normal.           EMERGENCY DEPARTMENT COURSE and DIFFERENTIAL DIAGNOSIS/MDM:   Vitals:    Vitals:    07/21/22 1050   BP: (!) 152/91   Pulse: 66   Resp: 16   Temp: 97.5 °F (36.4 °C)   TempSrc: Tympanic   SpO2: 99%   Weight: 235 lb (106.6 kg)   Height: 6' 1\" (1.854 m) MDM  Number of Diagnoses or Management Options  Laceration of scalp, initial encounter  Diagnosis management comments: Laceration stapled per procedure note. Tetanus updated. Patient instructed to return to the ER in 7 days for staple removal.  Home care instructions reviewed with patient at bedside. Procedure:    Lac Repair with [place]  Performed by: Lawson Goode NP  Consent: Verbal consent obtained. Risks and benefits: risks, benefits and alternatives were discussed  Consent given by: patient  Patient or guardian understanding: States understanding of the procedure being performed  Patient or guardian consent: Understanding of the procedure matches consent given  Patient identity confirmed: arm band and verbally with patient  Body area: scalp  Laceration length: 2 cm  Foreign bodies: no foreign bodies  Anesthesia: none  Preparation: Patient was prepped and draped in the usual sterile fashion. Irrigation solution: saline  Irrigation method: jet lavage and syringe  Amount of cleaning: extensive  Debridement: none  Skin closure: Staples  Number of staples: 2  Technique: simple  Approximation: close  Approximation difficulty: simple  Patient tolerance: Patient tolerated the procedure well with no immediate complications. FINAL IMPRESSION      1. Laceration of scalp, initial encounter          DISPOSITION/PLAN   DISPOSITION Decision To Discharge 07/21/2022 11:04:30 AM      PATIENT REFERRED TO:  [place] [address] [address]  [phone]  In 1 week  for staple removal    [address] [address] [phone]    In 1 week  to evaluate your elevated blood pressure      DISCHARGE MEDICATIONS:  New Prescriptions    No medications on file     Controlled Substances Monitoring:     RX Monitoring 1/14/2017   Attestation The Prescription Monitoring Report for this patient was reviewed today.    Periodic Controlled

## 2022-07-28 ENCOUNTER — TELEPHONE (OUTPATIENT)
Dept: PRIMARY CARE CLINIC | Age: 32
End: 2022-07-28

## 2022-07-28 NOTE — TELEPHONE ENCOUNTER
This nurse called and left a message for the patient, requesting the patient call this nurse back at [phone]. Needs staples removed.

## 2022-08-10 ENCOUNTER — HOSPITAL ENCOUNTER (OUTPATIENT)
Age: 32
Discharge: HOME | End: 2022-08-10
Payer: COMMERCIAL

## 2022-08-10 ENCOUNTER — HOSPITAL ENCOUNTER (OUTPATIENT)
Dept: ULTRASOUND IMAGING | Age: 32
Discharge: HOME | End: 2022-08-12
Payer: COMMERCIAL

## 2022-08-10 ENCOUNTER — OFFICE VISIT (OUTPATIENT)
Dept: PRIMARY CARE CLINIC | Age: 32
End: 2022-08-10
Payer: COMMERCIAL

## 2022-08-10 ENCOUNTER — HOSPITAL ENCOUNTER (OUTPATIENT)
Dept: CT IMAGING | Age: 32
Discharge: HOME | End: 2022-08-12
Payer: COMMERCIAL

## 2022-08-10 ENCOUNTER — HOSPITAL ENCOUNTER (OUTPATIENT)
Age: 32
Discharge: HOME | End: 2022-08-12

## 2022-08-10 VITALS
OXYGEN SATURATION: 99 % | BODY MASS INDEX: 30.48 KG/M2 | HEIGHT: 73 IN | SYSTOLIC BLOOD PRESSURE: 118 MMHG | TEMPERATURE: 97 F | DIASTOLIC BLOOD PRESSURE: 80 MMHG | RESPIRATION RATE: 14 BRPM | HEART RATE: 77 BPM | WEIGHT: 230 LBS

## 2022-08-10 DIAGNOSIS — N50.811 PAIN IN BOTH TESTICLES: ICD-10-CM

## 2022-08-10 DIAGNOSIS — R10.84 GENERALIZED ABDOMINAL PAIN: ICD-10-CM

## 2022-08-10 DIAGNOSIS — R10.30 INGUINAL PAIN, UNSPECIFIED LATERALITY: ICD-10-CM

## 2022-08-10 DIAGNOSIS — M54.50 LOW BACK PAIN, UNSPECIFIED BACK PAIN LATERALITY, UNSPECIFIED CHRONICITY, UNSPECIFIED WHETHER SCIATICA PRESENT: ICD-10-CM

## 2022-08-10 DIAGNOSIS — N50.812 PAIN IN BOTH TESTICLES: ICD-10-CM

## 2022-08-10 DIAGNOSIS — R10.30 INGUINAL PAIN, UNSPECIFIED LATERALITY: Primary | ICD-10-CM

## 2022-08-10 DIAGNOSIS — R19.8 ABDOMINAL GUARDING: ICD-10-CM

## 2022-08-10 DIAGNOSIS — R10.9 LEFT FLANK PAIN: ICD-10-CM

## 2022-08-10 DIAGNOSIS — Z87.438 HISTORY OF TORSION OF TESTIS: ICD-10-CM

## 2022-08-10 LAB
ABSOLUTE EOS #: 0.1 K/UL (ref 0–0.44)
ABSOLUTE IMMATURE GRANULOCYTE: <0.03 K/UL (ref 0–0.3)
ABSOLUTE LYMPH #: 2.19 K/UL (ref 1.1–3.7)
ABSOLUTE MONO #: 0.49 K/UL (ref 0.1–1.2)
ALBUMIN SERPL-MCNC: 5.1 G/DL (ref 3.5–5.2)
ALBUMIN/GLOBULIN RATIO: 2 (ref 1–2.5)
ALP BLD-CCNC: 106 U/L (ref 40–129)
ALT SERPL-CCNC: 79 U/L (ref 5–41)
ANION GAP SERPL CALCULATED.3IONS-SCNC: 8 MMOL/L (ref 9–17)
AST SERPL-CCNC: 35 U/L
BASOPHILS # BLD: 0 % (ref 0–2)
BASOPHILS ABSOLUTE: <0.03 K/UL (ref 0–0.2)
BILIRUB SERPL-MCNC: 0.46 MG/DL (ref 0.3–1.2)
BILIRUBIN, POC: 0
BLOOD URINE, POC: NORMAL
BUN BLDV-MCNC: 18 MG/DL (ref 6–20)
BUN/CREAT BLD: 22 (ref 9–20)
CALCIUM SERPL-MCNC: 9.8 MG/DL (ref 8.6–10.4)
CHLORIDE BLD-SCNC: 102 MMOL/L (ref 98–107)
CLARITY, POC: NORMAL
CO2: 27 MMOL/L (ref 20–31)
COLOR, POC: NORMAL
CREAT SERPL-MCNC: 0.83 MG/DL (ref 0.7–1.2)
EOSINOPHILS RELATIVE PERCENT: 2 % (ref 1–4)
GFR AFRICAN AMERICAN: >60 ML/MIN
GFR NON-AFRICAN AMERICAN: >60 ML/MIN
GFR SERPL CREATININE-BSD FRML MDRD: ABNORMAL ML/MIN/{1.73_M2}
GFR SERPL CREATININE-BSD FRML MDRD: ABNORMAL ML/MIN/{1.73_M2}
GLUCOSE BLD-MCNC: 86 MG/DL (ref 70–99)
GLUCOSE URINE, POC: NORMAL
HCT VFR BLD CALC: 48.1 % (ref 40.7–50.3)
HEMOGLOBIN: 15.8 G/DL (ref 13–17)
IMMATURE GRANULOCYTES: 0 %
KETONES, POC: NORMAL
LEUKOCYTE EST, POC: NORMAL
LYMPHOCYTES # BLD: 33 % (ref 24–43)
MCH RBC QN AUTO: 29.5 PG (ref 25.2–33.5)
MCHC RBC AUTO-ENTMCNC: 32.8 G/DL (ref 28.4–34.8)
MCV RBC AUTO: 89.9 FL (ref 82.6–102.9)
MONOCYTES # BLD: 7 % (ref 3–12)
NITRITE, POC: NORMAL
NRBC AUTOMATED: 0 PER 100 WBC
PDW BLD-RTO: 12.5 % (ref 11.8–14.4)
PH, POC: 6
PLATELET # BLD: 218 K/UL (ref 138–453)
PMV BLD AUTO: 9.1 FL (ref 8.1–13.5)
POTASSIUM SERPL-SCNC: 4.5 MMOL/L (ref 3.7–5.3)
PROTEIN, POC: NORMAL
RBC # BLD: 5.35 M/UL (ref 4.21–5.77)
SEG NEUTROPHILS: 58 % (ref 36–65)
SEGMENTED NEUTROPHILS ABSOLUTE COUNT: 3.91 K/UL (ref 1.5–8.1)
SODIUM BLD-SCNC: 137 MMOL/L (ref 135–144)
SPECIFIC GRAVITY, POC: 1.01
TOTAL PROTEIN: 7.6 G/DL (ref 6.4–8.3)
UROBILINOGEN, POC: NORMAL
WBC # BLD: 6.7 K/UL (ref 3.5–11.3)

## 2022-08-10 PROCEDURE — 99214 OFFICE O/P EST MOD 30 MIN: CPT | Performed by: NURSE PRACTITIONER

## 2022-08-10 PROCEDURE — 36415 COLL VENOUS BLD VENIPUNCTURE: CPT

## 2022-08-10 PROCEDURE — 85025 COMPLETE CBC W/AUTO DIFF WBC: CPT

## 2022-08-10 PROCEDURE — 81003 URINALYSIS AUTO W/O SCOPE: CPT | Performed by: NURSE PRACTITIONER

## 2022-08-10 PROCEDURE — 93976 VASCULAR STUDY: CPT

## 2022-08-10 PROCEDURE — 87186 SC STD MICRODIL/AGAR DIL: CPT

## 2022-08-10 PROCEDURE — 6360000004 HC RX CONTRAST MEDICATION: Performed by: NURSE PRACTITIONER

## 2022-08-10 PROCEDURE — 74177 CT ABD & PELVIS W/CONTRAST: CPT

## 2022-08-10 PROCEDURE — 87491 CHLMYD TRACH DNA AMP PROBE: CPT

## 2022-08-10 PROCEDURE — 87086 URINE CULTURE/COLONY COUNT: CPT

## 2022-08-10 PROCEDURE — 87077 CULTURE AEROBIC IDENTIFY: CPT

## 2022-08-10 PROCEDURE — 87591 N.GONORRHOEAE DNA AMP PROB: CPT

## 2022-08-10 PROCEDURE — 80053 COMPREHEN METABOLIC PANEL: CPT

## 2022-08-10 RX ADMIN — IOPAMIDOL 18 ML: 755 INJECTION, SOLUTION INTRAVENOUS at 13:53

## 2022-08-10 RX ADMIN — IOPAMIDOL 75 ML: 755 INJECTION, SOLUTION INTRAVENOUS at 13:53

## 2022-08-10 SDOH — ECONOMIC STABILITY: FOOD INSECURITY: WITHIN THE PAST 12 MONTHS, YOU WORRIED THAT YOUR FOOD WOULD RUN OUT BEFORE YOU GOT MONEY TO BUY MORE.: NEVER TRUE

## 2022-08-10 SDOH — ECONOMIC STABILITY: FOOD INSECURITY: WITHIN THE PAST 12 MONTHS, THE FOOD YOU BOUGHT JUST DIDN'T LAST AND YOU DIDN'T HAVE MONEY TO GET MORE.: NEVER TRUE

## 2022-08-10 ASSESSMENT — PATIENT HEALTH QUESTIONNAIRE - PHQ9
2. FEELING DOWN, DEPRESSED OR HOPELESS: 0
SUM OF ALL RESPONSES TO PHQ QUESTIONS 1-9: 0
SUM OF ALL RESPONSES TO PHQ QUESTIONS 1-9: 0
1. LITTLE INTEREST OR PLEASURE IN DOING THINGS: 0
SUM OF ALL RESPONSES TO PHQ QUESTIONS 1-9: 0
SUM OF ALL RESPONSES TO PHQ QUESTIONS 1-9: 0
SUM OF ALL RESPONSES TO PHQ9 QUESTIONS 1 & 2: 0

## 2022-08-10 ASSESSMENT — SOCIAL DETERMINANTS OF HEALTH (SDOH): HOW HARD IS IT FOR YOU TO PAY FOR THE VERY BASICS LIKE FOOD, HOUSING, MEDICAL CARE, AND HEATING?: NOT HARD AT ALL

## 2022-08-10 ASSESSMENT — ENCOUNTER SYMPTOMS
CONSTIPATION: 0
DIARRHEA: 0
ABDOMINAL PAIN: 1

## 2022-08-10 NOTE — PROGRESS NOTES
Name: Akil Goyal  : 1990         Chief Complaint:     Chief Complaint   Patient presents with    Lower Back Pain     Bilateral back pain, right being worse. Started 2 days ago. Urinary Frequency     Urgency, frequency. Groin Pain     Left sided       History of Present Illness:      Akil Goyal is a 28 y.o.  male who presents with Lower Back Pain (Bilateral back pain, right being worse. Started 2 days ago. ), Urinary Frequency (Urgency, frequency. ), and Groin Pain (Left sided)      HPI  The patient presents with complaints of right lower back, \"all over\" groin pain, and testicle pain that began 3 days ago. Denies penile discharge or pain. Denies scrotal swelling. Denies known testicular masses. Admits urinary frequency and urgency that began 3 days ago. Denies hematuria or dysuria. He has never had a kidney stone. He denies concern for STD. He reports he had his left testicle removed d/t testicular torsion 5-7 years ago. His presenting symptoms at that time included oblique pain and groin pain. Denies fever or chills. Admits nausea, though denies vomiting. At its worst, pain is rated 8 on a scale of 10. Pain is described as \"continuous dull pain\" that is worsened with activity such as walking. He denies history of hernia. Admits normal bowel movements. Past Medical History:     No past medical history on file. Reviewed all health maintenance requirements and ordered appropriate tests  Health Maintenance Due   Topic Date Due    COVID-19 Vaccine (1) Never done    HIV screen  Never done    Hepatitis C screen  Never done       Past Surgical History:     Past Surgical History:   Procedure Laterality Date    ANKLE SURGERY  2006    SPINAL CORD DECOMPRESSION  2017    SPINAL FUSION  2018    TESTICLE REMOVAL  2016    left    WISDOM TOOTH EXTRACTION  2009        Medications:       Prior to Admission medications    Medication Sig Start Date End Date Taking?  Authorizing Provider   ibuprofen (ADVIL;MOTRIN) 200 MG tablet Take 200 mg by mouth every 6 hours as needed for Pain  Patient not taking: Reported on 8/10/2022    Historical Provider, MD   acetaminophen (TYLENOL) 500 MG tablet Take 1,000 mg by mouth every 6 hours as needed for Pain  9/20/21  Historical Provider, MD        Allergies:       Vancomycin, Bactrim [sulfamethoxazole-trimethoprim], Pcn [penicillins], and Sulfa antibiotics    Social History:     Tobacco:    reports that he has quit smoking. His smoking use included cigarettes. He smoked an average of .5 packs per day. He has never used smokeless tobacco.  Alcohol:      reports no history of alcohol use. Drug Use:  reports no history of drug use. Family History:     Family History   Problem Relation Age of Onset    Deep Vein Thrombosis Sister     Diabetes Maternal Grandmother     Bleeding Prob Other        Review of Systems:     Positive and Negative as described in HPI    Review of Systems   Constitutional:  Negative for chills and fever. Gastrointestinal:  Positive for abdominal pain. Negative for constipation and diarrhea. Genitourinary:  Positive for flank pain (admits right flank pain), frequency, testicular pain and urgency. Negative for dysuria, hematuria, penile pain, penile swelling and scrotal swelling. Physical Exam:   Vitals:  /80   Pulse 77   Temp 97 °F (36.1 °C)   Resp 14   Ht 6' 1\" (1.854 m)   Wt 230 lb (104.3 kg)   SpO2 99%   BMI 30.34 kg/m²     Physical Exam  Constitutional:       General: He is not in acute distress. Appearance: Normal appearance. He is normal weight. He is ill-appearing. He is not toxic-appearing. HENT:      Head: Normocephalic. Cardiovascular:      Rate and Rhythm: Normal rate and regular rhythm. Heart sounds: Normal heart sounds. No murmur heard. Pulmonary:      Effort: Pulmonary effort is normal. No respiratory distress. Breath sounds: Normal breath sounds. No stridor. No wheezing, rhonchi or rales. HDL 40 07/16/2021 12:22 PM    LABA1C 4.9 07/16/2021 12:22 PM       Assessment/Plan:      Diagnosis Orders   1. Inguinal pain, unspecified laterality  CBC with Auto Differential    Comprehensive Metabolic Panel    Culture, Urine    Chlamydia/GC DNA, Urine    CT ABDOMEN PELVIS W IV CONTRAST Additional Contrast? Oral      2. Low back pain, unspecified back pain laterality, unspecified chronicity, unspecified whether sciatica present  POCT Urinalysis No Micro (Auto)    CBC with Auto Differential    Comprehensive Metabolic Panel    Culture, Urine    Chlamydia/GC DNA, Urine    CT ABDOMEN PELVIS W IV CONTRAST Additional Contrast? Oral      3. Pain in both testicles  CBC with Auto Differential    Comprehensive Metabolic Panel    Culture, Urine    Chlamydia/GC DNA, Urine    CT ABDOMEN PELVIS W IV CONTRAST Additional Contrast? Oral      4. Generalized abdominal pain  CT ABDOMEN PELVIS W IV CONTRAST Additional Contrast? Oral      5. Left flank pain  CT ABDOMEN PELVIS W IV CONTRAST Additional Contrast? Oral      6. Abdominal guarding  CT ABDOMEN PELVIS W IV CONTRAST Additional Contrast? Oral        - Renal stones versus appendicitis versus testicular torsion versus hernia  - POC UA today in office shows trace protein.   Negative blood, leukocytes, nitrates  - Further evaluation with CMP, CBC with differential, urine culture, urine chlamydia/gonorrhea screen  - Given the patient's significant abdominal and groin pain we will also further evaluate with stat abdomen pelvis CT with contrast   - Will call with above results and update treatment plan as appropriate  - Reviewed emergent signs and symptoms including new onset fever, chills, significant abdominal, testicle, or groin pain and when to present to the emergency department    Completed Refills   Requested Prescriptions      No prescriptions requested or ordered in this encounter       Orders Placed This Encounter   Procedures    Culture, Urine     Standing Status:   Future Number of Occurrences:   1     Standing Expiration Date:   8/10/2023     Order Specific Question:   Specify (ex-cath, midstream, cysto, etc)? Answer:   midstream    Chlamydia/GC DNA, Urine     Standing Status:   Future     Number of Occurrences:   1     Standing Expiration Date:   8/10/2023    CT ABDOMEN PELVIS W IV CONTRAST Additional Contrast? Oral     Standing Status:   Future     Number of Occurrences:   1     Standing Expiration Date:   8/11/2023     Order Specific Question:   Additional Contrast?     Answer:   Oral     Order Specific Question:   STAT Creatinine as needed:     Answer:   Yes    CBC with Auto Differential     Standing Status:   Future     Number of Occurrences:   1     Standing Expiration Date:   8/10/2023    Comprehensive Metabolic Panel     Standing Status:   Future     Number of Occurrences:   1     Standing Expiration Date:   8/10/2023    POCT Urinalysis No Micro (Auto)        No results found for this visit on 08/10/22. Return if symptoms worsen or fail to improve.     Electronically signed by SLICK Cedillo CNP on 08/10/22 at 12:55 PM.

## 2022-08-10 NOTE — PATIENT INSTRUCTIONS
SURVEY:    You may be receiving a survey from [employer] regarding your visit today. Please complete the survey to enable us to provide the highest quality of care to you and your family. If you cannot score us a very good on any question, please call the office to discuss how we could of made your experience a very good one. Thank you.

## 2022-08-11 ENCOUNTER — TELEPHONE (OUTPATIENT)
Dept: PRIMARY CARE CLINIC | Age: 32
End: 2022-08-11

## 2022-08-11 LAB
C. TRACHOMATIS DNA ,URINE: NEGATIVE
N. GONORRHOEAE DNA, URINE: NEGATIVE
SPECIMEN DESCRIPTION: NORMAL

## 2022-08-12 LAB
CULTURE: ABNORMAL
SPECIMEN DESCRIPTION: ABNORMAL

## 2022-08-12 RX ORDER — CIPROFLOXACIN 500 MG/1
TABLET, FILM COATED ORAL
Qty: 14 TABLET | Refills: 0 | Status: SHIPPED | OUTPATIENT
Start: 2022-08-12

## 2022-08-23 ENCOUNTER — TELEPHONE (OUTPATIENT)
Dept: PRIMARY CARE CLINIC | Age: 32
End: 2022-08-23

## 2022-08-23 NOTE — TELEPHONE ENCOUNTER
[facility] called stating that they received a referral for [facility]. They have attempted to reach him three times without success or returned calls.  She is going to close the referral and should the patient decide he wants to be seen, we can send a new referral.

## 2023-01-04 ENCOUNTER — OFFICE VISIT (OUTPATIENT)
Dept: PRIMARY CARE CLINIC | Age: 33
End: 2023-01-04
Payer: COMMERCIAL

## 2023-01-04 VITALS
SYSTOLIC BLOOD PRESSURE: 126 MMHG | WEIGHT: 241.2 LBS | HEART RATE: 91 BPM | OXYGEN SATURATION: 99 % | TEMPERATURE: 96.8 F | RESPIRATION RATE: 18 BRPM | DIASTOLIC BLOOD PRESSURE: 82 MMHG | BODY MASS INDEX: 31.97 KG/M2 | HEIGHT: 73 IN

## 2023-01-04 DIAGNOSIS — M25.522 LEFT ELBOW PAIN: Primary | ICD-10-CM

## 2023-01-04 DIAGNOSIS — M25.561 ACUTE PAIN OF BOTH KNEES: ICD-10-CM

## 2023-01-04 DIAGNOSIS — M25.562 ACUTE PAIN OF BOTH KNEES: ICD-10-CM

## 2023-01-04 PROCEDURE — 99213 OFFICE O/P EST LOW 20 MIN: CPT | Performed by: NURSE PRACTITIONER

## 2023-01-04 ASSESSMENT — PATIENT HEALTH QUESTIONNAIRE - PHQ9
SUM OF ALL RESPONSES TO PHQ9 QUESTIONS 1 & 2: 0
2. FEELING DOWN, DEPRESSED OR HOPELESS: 0
SUM OF ALL RESPONSES TO PHQ QUESTIONS 1-9: 0
1. LITTLE INTEREST OR PLEASURE IN DOING THINGS: 0

## 2023-01-04 NOTE — PATIENT INSTRUCTIONS
SURVEY:    You may be receiving a survey from [employer] regarding your visit today. Please complete the survey to enable us to provide the highest quality of care to you and your family. If you cannot score us a very good on any question, please call the office to discuss how we could of made your experience a very good one. Thank you.

## 2023-01-04 NOTE — PROGRESS NOTES
Name: Marcelina Beckwith  : 1990         Chief Complaint:     Chief Complaint   Patient presents with    Knee Pain     Bilateral knee pain. Denies any inj. About a week ago it started. States swelling. Elbow Pain     Left elbow pain, started 3 month ago. History of Present Illness:      Marcelina Beckwith is a 33 y.o.  male who presents with Knee Pain (Bilateral knee pain. Denies any inj. About a week ago it started. States swelling. ) and Elbow Pain (Left elbow pain, started 3 month ago. )      HPI    Left Elbow:  Admits left elbow pain that began 3 months ago. Denies injury to the elbow. Admits occasional swelling. Admits a \"bump\" on the anterior aspect near [facility], [place], operated by [facility] area. Admits pain especially when lifting. He has not noticed any redness or warmth. He has taken tylenol and icy hot with minimal relief. Admits repetitive motions at work. Bilateral Knee Pain:  Admits bilateral knee pain x1.5 weeks. Pain originally began in left knee. Admits pain and swelling, worse when kneeling down. Denies knee injury. He has tried a knee brace with benefit. Denies redness. Admits warmth. Denies fever or chills. Past Medical History:     No past medical history on file. Reviewed all health maintenance requirements and ordered appropriate tests  Health Maintenance Due   Topic Date Due    COVID-19 Vaccine (1) Never done    HIV screen  Never done    Hepatitis C screen  Never done    Flu vaccine (1) Never done       Past Surgical History:     Past Surgical History:   Procedure Laterality Date    ANKLE SURGERY  2006    SPINAL CORD DECOMPRESSION  2017    SPINAL FUSION  2018    TESTICLE REMOVAL  2016    left    WISDOM TOOTH EXTRACTION          Medications:       Prior to Admission medications    Medication Sig Start Date End Date Taking?  Authorizing Provider   diclofenac sodium (VOLTAREN) 1 % GEL Apply 2 g topically 4 times daily to affected joints 23  Yes SLICK Ponce - CNP   ibuprofen (ADVIL;MOTRIN) 200 MG tablet Take 200 mg by mouth every 6 hours as needed for Pain  Patient not taking: No sig reported    Historical Provider, MD   acetaminophen (TYLENOL) 500 MG tablet Take 1,000 mg by mouth every 6 hours as needed for Pain  9/20/21  Historical Provider, MD        Allergies:       Vancomycin, Bactrim [sulfamethoxazole-trimethoprim], Pcn [penicillins], and Sulfa antibiotics    Social History:     Tobacco:    reports that he has quit smoking. His smoking use included cigarettes. He smoked an average of .5 packs per day. He has never used smokeless tobacco.  Alcohol:      reports no history of alcohol use. Drug Use:  reports no history of drug use. Family History:     Family History   Problem Relation Age of Onset    Deep Vein Thrombosis Sister     Diabetes Maternal Grandmother     Bleeding Prob Other        Review of Systems:     Positive and Negative as described in HPI    Review of Systems   Musculoskeletal:  Positive for arthralgias. Physical Exam:   Vitals:  /82   Pulse 91   Temp 96.8 °F (36 °C)   Resp 18   Ht 6' 1\" (1.854 m)   Wt 241 lb 3.2 oz (109.4 kg)   SpO2 99%   BMI 31.82 kg/m²     Physical Exam  Constitutional:       General: He is not in acute distress. Appearance: Normal appearance. He is obese. He is not ill-appearing or toxic-appearing. Cardiovascular:      Rate and Rhythm: Normal rate and regular rhythm. Heart sounds: Normal heart sounds. No murmur heard. Pulmonary:      Effort: Pulmonary effort is normal. No respiratory distress. Breath sounds: Normal breath sounds. No stridor. No wheezing, rhonchi or rales. Musculoskeletal:      Comments: Left elbow without erythema, edema, or warmth. Left elbow full ROM with flexion, extension, supination, and pronation. Left radial pulse 2+. Left  strength 5/5. Left cap refill less than 2 seconds. Tenderness to palpation left anterior AC area LUE    Bilateral knees without erythema, edema, or warmth.  No tenderness to palpation anterior or posterior bilaterally. Negative anterior or posterior drawer sign bilaterally. Pain with bilateral knee flexion +crepitus   Neurological:      Mental Status: He is alert. Psychiatric:         Mood and Affect: Mood normal.         Behavior: Behavior normal.         Thought Content: Thought content normal.         Judgment: Judgment normal.       Data:     Lab Results   Component Value Date/Time     08/10/2022 12:35 PM    K 4.5 08/10/2022 12:35 PM     08/10/2022 12:35 PM    CO2 27 08/10/2022 12:35 PM    BUN 18 08/10/2022 12:35 PM    CREATININE 0.83 08/10/2022 12:35 PM    GLUCOSE 86 08/10/2022 12:35 PM    PROT 7.6 08/10/2022 12:35 PM    LABALBU 5.1 08/10/2022 12:35 PM    BILITOT 0.46 08/10/2022 12:35 PM    ALKPHOS 106 08/10/2022 12:35 PM    AST 35 08/10/2022 12:35 PM    ALT 79 08/10/2022 12:35 PM     Lab Results   Component Value Date/Time    WBC 6.7 08/10/2022 12:35 PM    RBC 5.35 08/10/2022 12:35 PM    HGB 15.8 08/10/2022 12:35 PM    HCT 48.1 08/10/2022 12:35 PM    MCV 89.9 08/10/2022 12:35 PM    MCH 29.5 08/10/2022 12:35 PM    MCHC 32.8 08/10/2022 12:35 PM    RDW 12.5 08/10/2022 12:35 PM     08/10/2022 12:35 PM    MPV 9.1 08/10/2022 12:35 PM     Lab Results   Component Value Date/Time    TSH 4.30 01/16/2017 09:40 PM     Lab Results   Component Value Date/Time    CHOL 142 07/16/2021 12:22 PM    HDL 40 07/16/2021 12:22 PM    LABA1C 4.9 07/16/2021 12:22 PM       Assessment/Plan:      Diagnosis Orders   1. Left elbow pain  [facility] [place]      2.  Acute pain of both knees  [facility] [place]          - Encouraged activity rest, ice, compression (with brace or sleeve) and elevation  - Rx topical Voltaren to assist with knee and elbow pain  - [facility] referral placed  - Follow-up 4-6 weeks for further evaluation or sooner with any concerns  - Suspicion for gout or infectious etiology is low at this time based on absence of erythema, edema, or warmth of joints. -With follow-up in 4-6 weeks, if symptoms are persisting or worsening, will consider further evaluation with uric acid, RA work-up, imaging, and/or referral to orthopedics     Completed Refills   Requested Prescriptions     Signed Prescriptions Disp Refills    diclofenac sodium (VOLTAREN) 1 %  g 0     Sig: Apply 2 g topically 4 times daily to affected joints       Orders Placed This Encounter   Procedures    [facility]     Referral Priority:   Routine     Referral Type:   Eval and Treat     Referral Reason:   Specialty Services Required     Requested Specialty:   Physical Therapist     Number of Visits Requested:   1        No results found for this visit on 01/04/23. Return in about 4 weeks (around 2/1/2023), or if symptoms worsen or fail to improve, for [facility] f/u .     Electronically signed by SLICK Garcia CNP on 01/05/23 at 7:36 AM.

## 2023-11-10 ENCOUNTER — OFFICE VISIT (OUTPATIENT)
Dept: PRIMARY CARE CLINIC | Age: 33
End: 2023-11-10
Payer: COMMERCIAL

## 2023-11-10 VITALS
BODY MASS INDEX: 33.66 KG/M2 | HEIGHT: 73 IN | TEMPERATURE: 96.8 F | RESPIRATION RATE: 18 BRPM | OXYGEN SATURATION: 98 % | WEIGHT: 254 LBS | DIASTOLIC BLOOD PRESSURE: 84 MMHG | HEART RATE: 60 BPM | SYSTOLIC BLOOD PRESSURE: 120 MMHG

## 2023-11-10 DIAGNOSIS — R51.9 INTRACTABLE HEADACHE, UNSPECIFIED CHRONICITY PATTERN, UNSPECIFIED HEADACHE TYPE: ICD-10-CM

## 2023-11-10 DIAGNOSIS — J34.89 SINUS PRESSURE: ICD-10-CM

## 2023-11-10 DIAGNOSIS — R09.81 CHRONIC NASAL CONGESTION: Primary | ICD-10-CM

## 2023-11-10 PROCEDURE — 99213 OFFICE O/P EST LOW 20 MIN: CPT | Performed by: NURSE PRACTITIONER

## 2023-11-10 NOTE — PATIENT INSTRUCTIONS
SURVEY:    You may be receiving a survey from "[employer] regarding your visit today. Please complete the survey to enable us to provide the highest quality of care to you and your family. If you cannot score us a very good on any question, please call the office to discuss how we could have made your experience a very good one. Thank you.

## 2023-11-13 ASSESSMENT — ENCOUNTER SYMPTOMS
SINUS PAIN: 1
SINUS PRESSURE: 1

## 2023-11-17 ENCOUNTER — HOSPITAL ENCOUNTER (OUTPATIENT)
Dept: CT IMAGING | Age: 33
Discharge: HOME | End: 2023-11-17
Payer: COMMERCIAL

## 2023-11-17 DIAGNOSIS — R09.81 CHRONIC NASAL CONGESTION: ICD-10-CM

## 2023-11-17 DIAGNOSIS — R51.9 INTRACTABLE HEADACHE, UNSPECIFIED CHRONICITY PATTERN, UNSPECIFIED HEADACHE TYPE: ICD-10-CM

## 2023-11-17 DIAGNOSIS — J34.89 SINUS PRESSURE: ICD-10-CM

## 2023-11-17 PROCEDURE — 70486 CT MAXILLOFACIAL W/O DYE: CPT

## 2024-02-13 ENCOUNTER — HOSPITAL ENCOUNTER (OUTPATIENT)
Age: 34
Discharge: HOME | End: 2024-02-13
Payer: COMMERCIAL

## 2024-02-13 LAB
ANION GAP SERPL CALCULATED.3IONS-SCNC: 11 MMOL/L (ref 9–17)
BASOPHILS # BLD: 0.06 K/UL (ref 0–0.2)
BASOPHILS NFR BLD: 1 % (ref 0–2)
BUN SERPL-MCNC: 12 MG/DL (ref 6–20)
BUN/CREAT SERPL: 13 (ref 9–20)
CALCIUM SERPL-MCNC: 9.5 MG/DL (ref 8.6–10.4)
CHLORIDE SERPL-SCNC: 103 MMOL/L (ref 98–107)
CO2 SERPL-SCNC: 26 MMOL/L (ref 20–31)
CREAT SERPL-MCNC: 0.9 MG/DL (ref 0.7–1.2)
EOSINOPHIL # BLD: 0.21 K/UL (ref 0–0.44)
EOSINOPHILS RELATIVE PERCENT: 2 % (ref 1–4)
ERYTHROCYTE [DISTWIDTH] IN BLOOD BY AUTOMATED COUNT: 13.1 % (ref 11.8–14.4)
GFR SERPL CREATININE-BSD FRML MDRD: >60 ML/MIN/1.73M2
GLUCOSE SERPL-MCNC: 87 MG/DL (ref 70–99)
HCT VFR BLD AUTO: 47 % (ref 40.7–50.3)
HGB BLD-MCNC: 15.9 G/DL (ref 13–17)
IMM GRANULOCYTES # BLD AUTO: 0.03 K/UL (ref 0–0.3)
IMM GRANULOCYTES NFR BLD: 0 %
LYMPHOCYTES NFR BLD: 3.09 K/UL (ref 1.1–3.7)
LYMPHOCYTES RELATIVE PERCENT: 36 % (ref 24–43)
MCH RBC QN AUTO: 30.6 PG (ref 25.2–33.5)
MCHC RBC AUTO-ENTMCNC: 33.8 G/DL (ref 28.4–34.8)
MCV RBC AUTO: 90.6 FL (ref 82.6–102.9)
MONOCYTES NFR BLD: 0.67 K/UL (ref 0.1–1.2)
MONOCYTES NFR BLD: 8 % (ref 3–12)
NEUTROPHILS NFR BLD: 53 % (ref 36–65)
NEUTS SEG NFR BLD: 4.65 K/UL (ref 1.5–8.1)
NRBC BLD-RTO: 0 PER 100 WBC
PLATELET # BLD AUTO: 251 K/UL (ref 138–453)
PMV BLD AUTO: 9.5 FL (ref 8.1–13.5)
POTASSIUM SERPL-SCNC: 4.3 MMOL/L (ref 3.7–5.3)
RBC # BLD AUTO: 5.19 M/UL (ref 4.21–5.77)
SODIUM SERPL-SCNC: 140 MMOL/L (ref 135–144)
WBC OTHER # BLD: 8.7 K/UL (ref 3.5–11.3)

## 2024-02-13 PROCEDURE — 85025 COMPLETE CBC W/AUTO DIFF WBC: CPT

## 2024-02-13 PROCEDURE — 36415 COLL VENOUS BLD VENIPUNCTURE: CPT

## 2024-02-13 PROCEDURE — 80048 BASIC METABOLIC PNL TOTAL CA: CPT

## 2024-09-09 ENCOUNTER — HOSPITAL ENCOUNTER (EMERGENCY)
Age: 34
Discharge: HOME | End: 2024-09-09
Attending: EMERGENCY MEDICINE
Payer: COMMERCIAL

## 2024-09-09 ENCOUNTER — APPOINTMENT (OUTPATIENT)
Dept: GENERAL RADIOLOGY | Age: 34
End: 2024-09-09
Payer: COMMERCIAL

## 2024-09-09 VITALS
TEMPERATURE: 98.1 F | SYSTOLIC BLOOD PRESSURE: 127 MMHG | OXYGEN SATURATION: 97 % | RESPIRATION RATE: 18 BRPM | DIASTOLIC BLOOD PRESSURE: 73 MMHG | HEART RATE: 82 BPM

## 2024-09-09 DIAGNOSIS — R07.9 NONSPECIFIC CHEST PAIN: Primary | ICD-10-CM

## 2024-09-09 DIAGNOSIS — R07.89 CHEST WALL PAIN: ICD-10-CM

## 2024-09-09 LAB
ANION GAP SERPL CALCULATED.3IONS-SCNC: 12 MMOL/L (ref 9–16)
BASOPHILS # BLD: 0.04 K/UL (ref 0–0.2)
BASOPHILS NFR BLD: 1 % (ref 0–2)
BUN SERPL-MCNC: 11 MG/DL (ref 6–20)
BUN/CREAT SERPL: 11 (ref 9–20)
CALCIUM SERPL-MCNC: 9.5 MG/DL (ref 8.6–10.4)
CHLORIDE SERPL-SCNC: 104 MMOL/L (ref 98–107)
CO2 SERPL-SCNC: 24 MMOL/L (ref 20–31)
CREAT SERPL-MCNC: 1 MG/DL (ref 0.7–1.2)
D DIMER PPP FEU-MCNC: <0.27 UG/ML FEU (ref 0–0.59)
EKG ATRIAL RATE: 80 BPM
EKG P AXIS: 63 DEGREES
EKG P-R INTERVAL: 180 MS
EKG Q-T INTERVAL: 352 MS
EKG QRS DURATION: 82 MS
EKG QTC CALCULATION (BAZETT): 405 MS
EKG R AXIS: 71 DEGREES
EKG T AXIS: 59 DEGREES
EKG VENTRICULAR RATE: 80 BPM
EOSINOPHIL # BLD: 0.1 K/UL (ref 0–0.44)
EOSINOPHILS RELATIVE PERCENT: 1 % (ref 1–4)
ERYTHROCYTE [DISTWIDTH] IN BLOOD BY AUTOMATED COUNT: 12.8 % (ref 11.8–14.4)
GFR, ESTIMATED: >90 ML/MIN/1.73M2
GLUCOSE SERPL-MCNC: 143 MG/DL (ref 74–99)
HCT VFR BLD AUTO: 47.3 % (ref 40.7–50.3)
HGB BLD-MCNC: 16.2 G/DL (ref 13–17)
IMM GRANULOCYTES # BLD AUTO: 0.03 K/UL (ref 0–0.3)
IMM GRANULOCYTES NFR BLD: 0 %
LYMPHOCYTES NFR BLD: 2.95 K/UL (ref 1.1–3.7)
LYMPHOCYTES RELATIVE PERCENT: 40 % (ref 24–43)
MCH RBC QN AUTO: 29.6 PG (ref 25.2–33.5)
MCHC RBC AUTO-ENTMCNC: 34.2 G/DL (ref 28.4–34.8)
MCV RBC AUTO: 86.3 FL (ref 82.6–102.9)
MONOCYTES NFR BLD: 0.5 K/UL (ref 0.1–1.2)
MONOCYTES NFR BLD: 7 % (ref 3–12)
NEUTROPHILS NFR BLD: 51 % (ref 36–65)
NEUTS SEG NFR BLD: 3.75 K/UL (ref 1.5–8.1)
NRBC BLD-RTO: 0 PER 100 WBC
PLATELET # BLD AUTO: 228 K/UL (ref 138–453)
PMV BLD AUTO: 9.2 FL (ref 8.1–13.5)
POTASSIUM SERPL-SCNC: 3.9 MMOL/L (ref 3.7–5.3)
RBC # BLD AUTO: 5.48 M/UL (ref 4.21–5.77)
SODIUM SERPL-SCNC: 140 MMOL/L (ref 136–145)
TROPONIN I SERPL HS-MCNC: <6 NG/L (ref 0–22)
WBC OTHER # BLD: 7.4 K/UL (ref 3.5–11.3)

## 2024-09-09 PROCEDURE — 93005 ELECTROCARDIOGRAM TRACING: CPT | Performed by: EMERGENCY MEDICINE

## 2024-09-09 PROCEDURE — 85025 COMPLETE CBC W/AUTO DIFF WBC: CPT

## 2024-09-09 PROCEDURE — 93010 ELECTROCARDIOGRAM REPORT: CPT | Performed by: INTERNAL MEDICINE

## 2024-09-09 PROCEDURE — 84484 ASSAY OF TROPONIN QUANT: CPT

## 2024-09-09 PROCEDURE — 71045 X-RAY EXAM CHEST 1 VIEW: CPT

## 2024-09-09 PROCEDURE — 80048 BASIC METABOLIC PNL TOTAL CA: CPT

## 2024-09-09 PROCEDURE — 99285 EMERGENCY DEPT VISIT HI MDM: CPT

## 2024-09-09 PROCEDURE — 85379 FIBRIN DEGRADATION QUANT: CPT

## 2024-09-09 PROCEDURE — 96374 THER/PROPH/DIAG INJ IV PUSH: CPT

## 2024-09-09 PROCEDURE — 6360000002 HC RX W HCPCS: Performed by: EMERGENCY MEDICINE

## 2024-09-09 RX ORDER — KETOROLAC TROMETHAMINE 30 MG/ML
30 INJECTION, SOLUTION INTRAMUSCULAR; INTRAVENOUS ONCE
Status: COMPLETED | OUTPATIENT
Start: 2024-09-09 | End: 2024-09-09

## 2024-09-09 RX ADMIN — KETOROLAC TROMETHAMINE 30 MG: 30 INJECTION, SOLUTION INTRAMUSCULAR at 18:58

## 2024-09-09 ASSESSMENT — PAIN SCALES - GENERAL
PAINLEVEL_OUTOF10: 6
PAINLEVEL_OUTOF10: 5
PAINLEVEL_OUTOF10: 3

## 2024-09-09 ASSESSMENT — ENCOUNTER SYMPTOMS
BACK PAIN: 0
ABDOMINAL DISTENTION: 0
SORE THROAT: 0
SHORTNESS OF BREATH: 1

## 2024-09-09 ASSESSMENT — PAIN DESCRIPTION - LOCATION
LOCATION: CHEST

## 2024-09-09 ASSESSMENT — PAIN - FUNCTIONAL ASSESSMENT: PAIN_FUNCTIONAL_ASSESSMENT: 0-10

## 2024-09-17 ENCOUNTER — OFFICE VISIT (OUTPATIENT)
Dept: PRIMARY CARE CLINIC | Age: 34
End: 2024-09-17
Payer: COMMERCIAL

## 2024-09-17 VITALS
BODY MASS INDEX: 34.7 KG/M2 | WEIGHT: 263 LBS | DIASTOLIC BLOOD PRESSURE: 76 MMHG | HEART RATE: 101 BPM | SYSTOLIC BLOOD PRESSURE: 126 MMHG | OXYGEN SATURATION: 98 %

## 2024-09-17 DIAGNOSIS — R07.89 ATYPICAL CHEST PAIN: ICD-10-CM

## 2024-09-17 DIAGNOSIS — M94.0 COSTOCHONDRITIS: Primary | ICD-10-CM

## 2024-09-17 PROCEDURE — G2211 COMPLEX E/M VISIT ADD ON: HCPCS | Performed by: FAMILY MEDICINE

## 2024-09-17 PROCEDURE — 99214 OFFICE O/P EST MOD 30 MIN: CPT | Performed by: FAMILY MEDICINE

## 2024-09-17 RX ORDER — OMEPRAZOLE 40 MG/1
40 CAPSULE, DELAYED RELEASE ORAL
Qty: 30 CAPSULE | Refills: 0 | Status: SHIPPED | OUTPATIENT
Start: 2024-09-17 | End: 2024-10-17

## 2024-09-17 RX ORDER — KETOROLAC TROMETHAMINE 10 MG/1
10 TABLET, FILM COATED ORAL EVERY 6 HOURS PRN
Qty: 12 TABLET | Refills: 0 | Status: SHIPPED | OUTPATIENT
Start: 2024-09-17 | End: 2024-09-20

## 2024-09-17 SDOH — ECONOMIC STABILITY: FOOD INSECURITY: WITHIN THE PAST 12 MONTHS, THE FOOD YOU BOUGHT JUST DIDN'T LAST AND YOU DIDN'T HAVE MONEY TO GET MORE.: NEVER TRUE

## 2024-09-17 SDOH — ECONOMIC STABILITY: INCOME INSECURITY: HOW HARD IS IT FOR YOU TO PAY FOR THE VERY BASICS LIKE FOOD, HOUSING, MEDICAL CARE, AND HEATING?: NOT HARD AT ALL

## 2024-09-17 SDOH — ECONOMIC STABILITY: FOOD INSECURITY: WITHIN THE PAST 12 MONTHS, YOU WORRIED THAT YOUR FOOD WOULD RUN OUT BEFORE YOU GOT MONEY TO BUY MORE.: NEVER TRUE

## 2024-09-17 ASSESSMENT — PATIENT HEALTH QUESTIONNAIRE - PHQ9
SUM OF ALL RESPONSES TO PHQ QUESTIONS 1-9: 0
SUM OF ALL RESPONSES TO PHQ9 QUESTIONS 1 & 2: 0
SUM OF ALL RESPONSES TO PHQ QUESTIONS 1-9: 0
SUM OF ALL RESPONSES TO PHQ QUESTIONS 1-9: 0
2. FEELING DOWN, DEPRESSED OR HOPELESS: NOT AT ALL
SUM OF ALL RESPONSES TO PHQ QUESTIONS 1-9: 0
1. LITTLE INTEREST OR PLEASURE IN DOING THINGS: NOT AT ALL

## 2024-10-14 DIAGNOSIS — R07.89 ATYPICAL CHEST PAIN: ICD-10-CM

## 2024-10-14 RX ORDER — OMEPRAZOLE 40 MG/1
40 CAPSULE, DELAYED RELEASE ORAL
Qty: 90 CAPSULE | Refills: 1 | Status: SHIPPED | OUTPATIENT
Start: 2024-10-14 | End: 2025-04-12

## 2024-10-16 ENCOUNTER — OFFICE VISIT (OUTPATIENT)
Dept: PRIMARY CARE CLINIC | Age: 34
End: 2024-10-16

## 2024-10-16 VITALS
WEIGHT: 264 LBS | DIASTOLIC BLOOD PRESSURE: 80 MMHG | HEART RATE: 98 BPM | OXYGEN SATURATION: 99 % | SYSTOLIC BLOOD PRESSURE: 122 MMHG | BODY MASS INDEX: 34.83 KG/M2

## 2024-10-16 DIAGNOSIS — Z98.1 S/P CERVICAL SPINAL FUSION: ICD-10-CM

## 2024-10-16 DIAGNOSIS — R07.89 ATYPICAL CHEST PAIN: Primary | ICD-10-CM

## 2024-10-16 DIAGNOSIS — M94.0 COSTOCHONDRITIS: ICD-10-CM

## 2024-10-16 NOTE — PROGRESS NOTES
[facility]      Patient:  Ricik Mckeon 34 y.o. male     Date of Service: 10/16/24        Chief Complaint:   Chief Complaint   Patient presents with    Follow-up     Chest pain         History of Present Illness     Follow-up on previously noted concerns of chest pain that has been ongoing for several weeks.  Patient also had attended emergency department at that time.  Initial evaluation was reassuring and negative for acute process.    Symptoms are now resolved and patient did report improvement with Toradol, PPI treatment.    Allergies:    Vancomycin, Bactrim [sulfamethoxazole-trimethoprim], Pcn [penicillins], and Sulfa antibiotics    Medication List:    Current Outpatient Medications   Medication Sig Dispense Refill    omeprazole (PRILOSEC) 40 MG delayed release capsule Take 1 capsule by mouth every morning (before breakfast) 90 capsule 1    ketorolac (TORADOL) 10 MG tablet Take 1 tablet by mouth every 6 hours as needed for Pain 12 tablet 0     No current facility-administered medications for this visit.          Review of Systems   See hpi  Physical Exam   Physical Exam  Constitutional:       Appearance: Well-developed.   HENT:      Head: Normocephalic.      Right Ear: External ear normal.      Left Ear: External ear normal  RRR  CTAB  Musculoskeletal:         General: Normal range of motion.      Cervical back: Normal range of motion.   Skin:     General: Skin is warm.      Findings: No erythema.   Surgical scar posterior neck from neck fusion  Neurological:      Mental Status: Alert and oriented to person, place, and time.   Psychiatric:         Behavior: Behavior normal.     Vitals:    10/16/24 1511   BP: 122/80   Pulse: 98   SpO2: 99%           Assessment and Plan     Chest pain, atypical, costochondritis    Overall history and exam findings are reassuring at this time.  The symptoms are resolved at this time and appear consistent with atypical chest pain, costochondritis and very

## 2025-05-01 DIAGNOSIS — R07.89 ATYPICAL CHEST PAIN: ICD-10-CM

## 2025-05-01 RX ORDER — OMEPRAZOLE 40 MG/1
40 CAPSULE, DELAYED RELEASE ORAL
Qty: 90 CAPSULE | Refills: 1 | Status: SHIPPED | OUTPATIENT
Start: 2025-05-01 | End: 2025-10-28